# Patient Record
Sex: FEMALE | Employment: OTHER | URBAN - METROPOLITAN AREA
[De-identification: names, ages, dates, MRNs, and addresses within clinical notes are randomized per-mention and may not be internally consistent; named-entity substitution may affect disease eponyms.]

---

## 2017-01-18 ENCOUNTER — HOSPITAL ENCOUNTER (OUTPATIENT)
Dept: PHYSICAL THERAPY | Age: 77
Discharge: HOME | End: 2017-01-18
Payer: MEDICARE

## 2017-01-18 PROCEDURE — 97110 THERAPEUTIC EXERCISES: CPT

## 2017-01-19 NOTE — PROGRESS NOTES
Alveta Holstein   (ZPL:[id number]) [address]  at  [address]  [phone]  [phone]                Outpatient PHYSICAL THERAPY: Daily Note  Fall Risk Score: 7 (? 5 = High Risk)    ICD-10: Treatment Diagnosis: Difficulty walking, not elsewhere classified (R26.2), Muscle weakness, generalized (M62.81), Pain in right wrist (M25.531)    DATE: 1/18/2017  REFERRING PHYSICIAN:  Solomon Edward MD MD Orders: evaluate and treat  Return Physician Appointment: TBD  MEDICAL/REFERRING DIAGNOSIS: LE weakness, right wrist pain  DATE OF ONSET: 9/27/16   PRIOR LEVEL OF FUNCTION: independent  PRECAUTIONS/ALLERGIES:   Allergies   Allergen Reactions    Coenzyme Q10 Rash    Niacin (Inositol Niacinate) Rash    Adhesive Tape-Silicones Rash     sts paper tape    Ambien [Zolpidem] Other (comments)     nightmares    Keflex [Cephalexin] Rash    Oxycodone Other (comments)     Slurred speech, hallucinations, droopy face, word finding issues    Pcn [Penicillins] Rash    Ramipril Other (comments)     nightmares       ASSESSMENT:  ?????? ? ? This section established at most recent assessment?????????? Patient is a 77 y.o. female who presents to physical therapy with difficulty walking, decreased balance and history of recent fall which resulted in right wrist injury. She also presents with weakness and decreased mobility which limits her ability to perform ADL without assistance and places patient at risk for falls in the future. Patient would benefit from skilled physical therapy to improve overall mobility and function. PROBLEM LIST (Impairments causing functional limitations):  1. Decreased Strength affecting function  2. Decreased ADL/Functional Activities  3. Decreased Flexibility/joint mobility  4. Increased Pain affecting function  5.  Decreased Activity tolerance   GOALS: (Goals have been discussed and agreed upon with patient.)  SHORT-TERM FUNCTIONAL GOALS: Time Frame: 6 weeks  1. Patient demonstrates independence with home exercise program without verbal cueing provided by therapist.  2. Patient will perform 5 minutes or greater of standing functional tasks to improve ability to cook with less difficulty. DISCHARGE GOALS: Time Frame: 12 weeks  1. Patient will report LEFS score of 45/80 or greater to demonstrate improved LE function. 2. Patient will decrease TUG score to less than 13 seconds to reduce risk of future falls. 3. Patient will decrease 5 stair up/down to less than 15 seconds to improve ability to ambulate around her community. 4. Patient will improve her dynamic balance by increasing her ability to maintain semi tandem stance for greater than 20 seconds to reduce her risk of future falls. REHABILITATION POTENTIAL FOR STATED GOALS: GoodPLAN OF CARE:INTERVENTIONS PLANNED: (Benefits and precautions of physical therapy have been discussed with the patient)  1. Patient education including pathophysiology of falls and decreased mobility, back education/training (sleeping, sitting and standing positions, posture re-education and body mechanics) and instructions of home exercise program.   2. Therapeutic exercises including strength, mobility and flexibility tasks. 3. Manual therapy including soft tissue mobilizations, functional joint mobilizations and neuromuscular re-education to improve motion and reduce pain. TREATMENT PLAN EFFECTIVE DATES: 12/1/16 TO 2/23/17  FREQUENCY/DURATION: Follow patient 2 times a week for 12 weeks to address above goals. SUBJECTIVE:    History of Present Injury/Illness (Reason for Referral): Ras Alcantara presents with difficulty walking, weakness and decreased balance. She also has had recent fall which resulted in right wrist fracture which required surgery to repair. She is well known to therapist having undergone previous therapy bout to help improve mobility and balance when her fall occurred.  Her goals are to improve her leg strength and not fall any more. Aggravating factors: cooking, doing dishes, lifting Relieving factors: medicine, heat    Present Symptoms: Patient reports she has been exercising a lot at home this week. Pain Intensity 1: 0  Dominant Side: right  Past Medical History: Ms. Vianey Bertrand  has a past medical history of Acute renal failure [facility]) (June, 2008); Arthritis; Asthma; CAD (coronary artery disease); COPD (chronic obstructive pulmonary disease) ([facility] Deep vein thrombosis of lower leg [facility]) (June, 2008); Diabetes ([facility] Diabetes mellitus ([facility] Dislocation of right shoulder joint; Dyspnea on exertion (October, 2010); GERD (gastroesophageal reflux disease); High cholesterol; Hypertension; Hypothyroid; Morbid obesity ([facility] Myocardial infarction ([facility] (1991); Osteomyelitis of left foot (HCC) (???); Restless leg syndrome; and Sleep apnea. She also has no past medical history of Aneurysm ([facility] Arrhythmia; Autoimmune disease ([facility] Cancer ([facility] Chronic kidney disease; Coagulation defects; DEMENTIA; Difficult intubation; Infectious disease; Liver disease; Malignant hyperthermia due to anesthesia; Nausea & vomiting; Neurological disorder; Pseudocholinesterase deficiency; Psychiatric disorder; PUD (peptic ulcer disease); Stroke [facility]); or Thyroid disease. She also  has a past surgical history that includes tonsil and adenoidectomy (Childhood); coronary stent placement (1997 & 2010); hernia repair (2006); breast biopsy (2000); tubal ligation (1977); tonsillectomy; adenoidectomy; cardiac surg procedure unlist; knee replacement (May, 2008); orthopaedic; and orthopaedic. Current Medications:   Current Outpatient Prescriptions on File Prior to Encounter   Medication Sig Dispense Refill    magnesium oxide (MAG-OX) 400 mg tablet Take 400 mg by mouth daily.  insulin glargine (LANTUS) 100 unit/mL injection 50 Units by SubCUTAneous route every morning.       insulin glargine (LANTUS) 100 unit/mL injection 30 Units by SubCUTAneous route nightly.  insulin regular (NOVOLIN R, HUMULIN R) 100 unit/mL injection by SubCUTAneous route. Regular Insulin 8 units before breakfast and 8 units before lunch and 10 units at bedtime      oxybutynin chloride XL 10 mg CR tablet Take 10 mg by mouth daily. Take / use AM day of surgery with a sip of water per anesthesia protocols. Indications: \"BLADDER\"      amLODIPine (NORVASC) 5 mg tablet Take 5 mg by mouth daily. Take / use AM day of surgery with a sip of water per anesthesia protocols. Indications: HYPERTENSION      metoprolol-XL (TOPROL-XL) 100 mg XL tablet Take 100 mg by mouth daily. Take / use AM day of surgery with a sip of water per anesthesia protocols. Indications: HYPERTENSION      prasugrel (EFFIENT) 10 mg tablet Take 10 mg by mouth daily. Dr Carlton Witt and Dr Tucker Needle aware that pt is continuing medication prior to surgery. Indications: THROMBOSIS PREVENTION AFTER PCI      aspirin (ASPIRIN) 325 mg tablet Take 325 mg by mouth every morning.  valsartan-hydrochlorothiazide (DIOVAN-HCT) 320-25 mg per tablet Take 1 Tab by mouth daily. Indications: HYPERTENSION      levothyroxine (SYNTHROID) 50 mcg tablet Take 50 mcg by mouth Daily (before breakfast). Take / use AM day of surgery with a sip of water per anesthesia protocols. Indications: HYPOTHYROIDISM      budesonide-formoterol (SYMBICORT) 160-4.5 mcg/Actuation HFA inhaler Take 2 Puffs by inhalation two (2) times a day. Take / use AM day of surgery with a sip of water per anesthesia protocols.  atorvastatin (LIPITOR) 80 mg tablet Take 80 mg by mouth every evening. Indications: HYPERCHOLESTEROLEMIA      furosemide (LASIX) 20 mg tablet Take 20 mg by mouth daily. Indications: EDEMA      ezetimibe (ZETIA) 10 mg tablet Take 10 mg by mouth every evening.  Indications: HYPERCHOLESTEROLEMIA      albuterol (PROVENTIL, VENTOLIN) 90 mcg/Actuation inhaler Take 2 Puffs by inhalation every four (4) hours as needed for Shortness of Breath.  fluoxetine (PROZAC) 20 mg Tab Take 20 mg by mouth daily. Take / use AM day of surgery with a sip of water per anesthesia protocols. No current facility-administered medications on file prior to encounter. Date Last Reviewed:1/18/2017  Social History/Home Situation:  Lives in a private home setting. No physical barriers present in home setting at this time. Work/Activity History: Retired  Quality of Life: Patient rates her quality of life as good. OBJECTIVE:    Outcome Measure: Tool Used: Lower Extremity Functional Scale (LEFS)  Score:  Initial: 14/80 (12/1/16) Most Recent: X/80 (Date: -- )   Interpretation of Score: 20 questions each scored on a 5 point scale with 0 representing \"extreme difficulty or unable to perform\" and 4 representing \"no difficulty\". The lower the score, the greater the functional disability. 80/80 represents no disability. Minimal detectable change is 9 points. Score 80 79-63 62-48 47-32 31-16 15-1 0   Modifier CH CI CJ CK CL CM CN     ? Mobility - Walking and Moving Around:     - CURRENT STATUS: CM - 80%-99% impaired, limited or restricted    - GOAL STATUS:  CK - 40%-59% impaired, limited or restricted    - D/C STATUS:  ---------------To be determined---------------       Strength:       RIGHT LEFT    Knee extension  4/5 4/5    Knee flexion 4/5 4/5    Hip extension  4/5 4/5    Hip abduction 4/5 4/5    Hip flexion 4/5 4/5                      Functional Mobility:            Timed Up and Go 13.78 seconds with rollator     Stair (5 steps) 30.23 with min A from therapist to steady    Sit to stands (5 reps) 10.18 sec (21 in chair height)    Sit to stands (30 sec) 14 repetitions (21 in chair height)    Functional Activity Tolerance 2 minutes           Observation/Gait Assessment:  Patient ambulates with rollator with decreased step length, heavy UE reliance on AD and forward trunk lean.  Observable fatigue noted with approximately 50 ft of continuous walking. Balance:     - Semi Tandem Stance: Right- 20.13 sec, Left- 10.11 sec   - Tandem stance: Unable   - Single leg Stance: Unable     Objective data as of: 12/1/16  TREATMENT:    (In addition to Assessment/Re-Assessment sessions the following treatments were rendered)  Therapeutic Exercise: (45 Minutes):  Exercises per grid below to improve mobility, strength and balance. Required moderate visual, verbal and manual cues to promote proper body mechanics. Progressed resistance, range, repetitions and complexity of movement as indicated. (used abbreviations BET- back education training) Date:  12/8/16 Date:  12/27/16 Date:  1/18/17   Activity/Exercise Parameters Parameters Parameters   Patient Education Update HEP Review HEP Update HEP   LAQ 2 min 2 min 2 min   Sit to stand  2 x 10 2 x 10 2 x 10, with bicep curl, #3s   Kitchen sink balance 5 min Heel raise, kicks, marches, 2 x 20 each Marches x 20   Walking bouts 1 min x 3 1:30 x 3 1 min x 3   Stair taps 20 x 3 -- --   Standing trials 1 min x 3 1 min x 2 --   Bike -- 5 min  5 min   [place] -- -- 3 x 10       Manual Therapy (     ):   For improved ROM and mobility:    · To be provided next visit     (Used abbreviations: MET - muscle energy technique; PNF - proprioceptive neuromuscular facilitation; NMR - neuromuscular re-education; a/p - anterior to posterior; p/a - posterior to anterior, FMP - functional movement patterns)  Therapeutic Modalities: (for pain and inflammation)                                                                                              HEP: As above; handouts given to patient for all exercises. ______________________________________________________________________________________________________    Treatment Assessment:  Patient continues to fatigue very quickly with any prolonged weight bearing activity and requires frequent rest breaks throughout today's session.  Patient reports 0/10 pain at end of today's visit. Progression/Medical Necessity:   · Patient is expected to demonstrate progress in strength, range of motion and balance to increase independence with ADL tasks. Compliance with Program/Exercises: compliant most of the time. Reason for Continuation of Services/Other Comments:  · Patient continues to require present interventions due to patient's inability to cook, clean and walk without difficulty. Recommendations/Intent for next treatment session: Continue to challenge dynamic balance and strength.      Total Treatment Duration: 45 minutes   PT Patient Time In/Time Out  Time In: 1630  Time Out: 1475  [address] [facility] PT, DPT

## 2017-01-25 ENCOUNTER — HOSPITAL ENCOUNTER (OUTPATIENT)
Dept: PHYSICAL THERAPY | Age: 77
Discharge: HOME | End: 2017-01-25
Payer: MEDICARE

## 2017-01-25 NOTE — PROGRESS NOTES
Therapy Center at [address] [place] [address], Adore Erazo   [phone]   [phone]    DATE: 1/25/2017    Patient cancelled her appointment today due to not feeling well. Will plan to follow up on next scheduled visit.       Jm Guzman, PT, DPT

## 2017-02-15 ENCOUNTER — HOSPITAL ENCOUNTER (OUTPATIENT)
Dept: PHYSICAL THERAPY | Age: 77
Discharge: HOME | End: 2017-02-15
Payer: MEDICARE

## 2017-02-15 PROCEDURE — 97110 THERAPEUTIC EXERCISES: CPT

## 2017-02-15 NOTE — PROGRESS NOTES
Benjamin Columbus   (XWM:[id number]) [address] at  [address]  [phone]  [phone]                Outpatient PHYSICAL THERAPY: Daily Note  Fall Risk Score: 7 (? 5 = High Risk)    ICD-10: Treatment Diagnosis: Difficulty walking, not elsewhere classified (R26.2), Muscle weakness, generalized (M62.81), Pain in right wrist (M25.531)    DATE: 2/15/2017  REFERRING PHYSICIAN:  Lee Aldana MD MD Orders: evaluate and treat  Return Physician Appointment: TBD  MEDICAL/REFERRING DIAGNOSIS: LE weakness, right wrist pain  DATE OF ONSET: 9/27/16   PRIOR LEVEL OF FUNCTION: independent  PRECAUTIONS/ALLERGIES:   Allergies   Allergen Reactions    Coenzyme Q10 Rash    Niacin (Inositol Niacinate) Rash    Adhesive Tape-Silicones Rash     sts paper tape    Ambien [Zolpidem] Other (comments)     nightmares    Keflex [Cephalexin] Rash    Oxycodone Other (comments)     Slurred speech, hallucinations, droopy face, word finding issues    Pcn [Penicillins] Rash    Ramipril Other (comments)     nightmares       ASSESSMENT:  ?????? ? ? This section established at most recent assessment?????????? Patient is a 77 y.o. female who presents to physical therapy with difficulty walking, decreased balance and history of recent fall which resulted in right wrist injury. She also presents with weakness and decreased mobility which limits her ability to perform ADL without assistance and places patient at risk for falls in the future. Patient would benefit from skilled physical therapy to improve overall mobility and function. PROBLEM LIST (Impairments causing functional limitations):  1. Decreased Strength affecting function  2. Decreased ADL/Functional Activities  3. Decreased Flexibility/joint mobility  4. Increased Pain affecting function  5.  Decreased Activity tolerance   GOALS: (Goals have been discussed and agreed upon with patient.)  SHORT-TERM FUNCTIONAL GOALS: Time Frame: 6 weeks  1. Patient demonstrates independence with home exercise program without verbal cueing provided by therapist.  2. Patient will perform 5 minutes or greater of standing functional tasks to improve ability to cook with less difficulty. DISCHARGE GOALS: Time Frame: 12 weeks  1. Patient will report LEFS score of 45/80 or greater to demonstrate improved LE function. 2. Patient will decrease TUG score to less than 13 seconds to reduce risk of future falls. 3. Patient will decrease 5 stair up/down to less than 15 seconds to improve ability to ambulate around her community. 4. Patient will improve her dynamic balance by increasing her ability to maintain semi tandem stance for greater than 20 seconds to reduce her risk of future falls. REHABILITATION POTENTIAL FOR STATED GOALS: GoodPLAN OF CARE:INTERVENTIONS PLANNED: (Benefits and precautions of physical therapy have been discussed with the patient)  1. Patient education including pathophysiology of falls and decreased mobility, back education/training (sleeping, sitting and standing positions, posture re-education and body mechanics) and instructions of home exercise program.   2. Therapeutic exercises including strength, mobility and flexibility tasks. 3. Manual therapy including soft tissue mobilizations, functional joint mobilizations and neuromuscular re-education to improve motion and reduce pain. TREATMENT PLAN EFFECTIVE DATES: 12/1/16 TO 2/23/17  FREQUENCY/DURATION: Follow patient 2 times a week for 12 weeks to address above goals. SUBJECTIVE:    History of Present Injury/Illness (Reason for Referral): Denise Erickson presents with difficulty walking, weakness and decreased balance. She also has had recent fall which resulted in right wrist fracture which required surgery to repair. She is well known to therapist having undergone previous therapy bout to help improve mobility and balance when her fall occurred.  Her goals are to improve her leg strength and not fall any more. Aggravating factors: cooking, doing dishes, lifting Relieving factors: medicine, heat    Present Symptoms: Patient says \"I've been bad about not exercising regularly the past week. \"  Pain Intensity 1: 0  Dominant Side: right  Past Medical History: Ms. Veto Christianson  has a past medical history of Acute renal failure [facility]) (June, 2008); Arthritis; Asthma; CAD (coronary artery disease); COPD (chronic obstructive pulmonary disease) ([facility] [address] Deep vein thrombosis of lower leg [facility]) (June, 2008); Diabetes ([facility] [address] Diabetes mellitus ([facility] [address] Dislocation of right shoulder joint; Dyspnea on exertion (October, 2010); GERD (gastroesophageal reflux disease); High cholesterol; Hypertension; Hypothyroid; Morbid obesity ([facility] [address] Myocardial infarction ([facility] [address] (1991); Osteomyelitis of left foot (HCC) (???); Restless leg syndrome; and Sleep apnea. She also has no past medical history of Aneurysm ([facility] [address] Arrhythmia; Autoimmune disease ([facility] [address] Cancer ([facility] [address] Chronic kidney disease; Coagulation defects; DEMENTIA; Difficult intubation; Infectious disease; Liver disease; Malignant hyperthermia due to anesthesia; Nausea & vomiting; Neurological disorder; Pseudocholinesterase deficiency; Psychiatric disorder; PUD (peptic ulcer disease); Stroke [facility]); or Thyroid disease. She also  has a past surgical history that includes tonsil and adenoidectomy (Childhood); coronary stent placement (1997 & 2010); hernia repair (2006); breast biopsy (2000); tubal ligation (1977); tonsillectomy; adenoidectomy; cardiac surg procedure unlist; knee replacement (May, 2008); orthopaedic; and orthopaedic. Current Medications:   Current Outpatient Prescriptions on File Prior to Encounter   Medication Sig Dispense Refill    magnesium oxide (MAG-OX) 400 mg tablet Take 400 mg by mouth daily.  insulin glargine (LANTUS) 100 unit/mL injection 50 Units by SubCUTAneous route every morning.       insulin glargine (LANTUS) 100 unit/mL injection 30 Units by SubCUTAneous route nightly.  insulin regular (NOVOLIN R, HUMULIN R) 100 unit/mL injection by SubCUTAneous route. Regular Insulin 8 units before breakfast and 8 units before lunch and 10 units at bedtime      oxybutynin chloride XL 10 mg CR tablet Take 10 mg by mouth daily. Take / use AM day of surgery with a sip of water per anesthesia protocols. Indications: \"BLADDER\"      amLODIPine (NORVASC) 5 mg tablet Take 5 mg by mouth daily. Take / use AM day of surgery with a sip of water per anesthesia protocols. Indications: HYPERTENSION      metoprolol-XL (TOPROL-XL) 100 mg XL tablet Take 100 mg by mouth daily. Take / use AM day of surgery with a sip of water per anesthesia protocols. Indications: HYPERTENSION      prasugrel (EFFIENT) 10 mg tablet Take 10 mg by mouth daily. Dr Vicky Yee and Dr Kaleigh Alvarez aware that pt is continuing medication prior to surgery. Indications: THROMBOSIS PREVENTION AFTER PCI      aspirin (ASPIRIN) 325 mg tablet Take 325 mg by mouth every morning.  valsartan-hydrochlorothiazide (DIOVAN-HCT) 320-25 mg per tablet Take 1 Tab by mouth daily. Indications: HYPERTENSION      levothyroxine (SYNTHROID) 50 mcg tablet Take 50 mcg by mouth Daily (before breakfast). Take / use AM day of surgery with a sip of water per anesthesia protocols. Indications: HYPOTHYROIDISM      budesonide-formoterol (SYMBICORT) 160-4.5 mcg/Actuation HFA inhaler Take 2 Puffs by inhalation two (2) times a day. Take / use AM day of surgery with a sip of water per anesthesia protocols.  atorvastatin (LIPITOR) 80 mg tablet Take 80 mg by mouth every evening. Indications: HYPERCHOLESTEROLEMIA      furosemide (LASIX) 20 mg tablet Take 20 mg by mouth daily. Indications: EDEMA      ezetimibe (ZETIA) 10 mg tablet Take 10 mg by mouth every evening.  Indications: HYPERCHOLESTEROLEMIA      albuterol (PROVENTIL, VENTOLIN) 90 mcg/Actuation inhaler Take 2 Puffs by inhalation every four (4) hours as needed for Shortness of Breath.  fluoxetine (PROZAC) 20 mg Tab Take 20 mg by mouth daily. Take / use AM day of surgery with a sip of water per anesthesia protocols. No current facility-administered medications on file prior to encounter. Date Last Reviewed:2/15/2017  Social History/Home Situation:  Lives in a private home setting. No physical barriers present in home setting at this time. Work/Activity History: Retired  Quality of Life: Patient rates her quality of life as good. OBJECTIVE:    Outcome Measure: Tool Used: Lower Extremity Functional Scale (LEFS)  Score:  Initial: 14/80 (12/1/16) Most Recent: X/80 (Date: -- )   Interpretation of Score: 20 questions each scored on a 5 point scale with 0 representing \"extreme difficulty or unable to perform\" and 4 representing \"no difficulty\". The lower the score, the greater the functional disability. 80/80 represents no disability. Minimal detectable change is 9 points. Score 80 79-63 62-48 47-32 31-16 15-1 0   Modifier CH CI CJ CK CL CM CN     ? Mobility - Walking and Moving Around:     - CURRENT STATUS: CM - 80%-99% impaired, limited or restricted    - GOAL STATUS:  CK - 40%-59% impaired, limited or restricted    - D/C STATUS:  ---------------To be determined---------------       Strength:       RIGHT LEFT    Knee extension  4/5 4/5    Knee flexion 4/5 4/5    Hip extension  4/5 4/5    Hip abduction 4/5 4/5    Hip flexion 4/5 4/5                      Functional Mobility:            Timed Up and Go 13.78 seconds with rollator     Stair (5 steps) 30.23 with min A from therapist to steady    Sit to stands (5 reps) 10.18 sec (21 in chair height)    Sit to stands (30 sec) 14 repetitions (21 in chair height)    Functional Activity Tolerance 2 minutes           Observation/Gait Assessment:  Patient ambulates with rollator with decreased step length, heavy UE reliance on AD and forward trunk lean.  Observable fatigue noted with approximately 50 ft of continuous walking. Balance:     - Semi Tandem Stance: Right- 20.13 sec, Left- 10.11 sec   - Tandem stance: Unable   - Single leg Stance: Unable     Objective data as of: 12/1/16  TREATMENT:    (In addition to Assessment/Re-Assessment sessions the following treatments were rendered)  Therapeutic Exercise: (45 Minutes):  Exercises per grid below to improve mobility, strength and balance. Required moderate visual, verbal and manual cues to promote proper body mechanics. Progressed resistance, range, repetitions and complexity of movement as indicated. (used abbreviations BET- back education training) Date:  12/27/16 Date:  1/18/17 Date:  2/15/17   Activity/Exercise Parameters Parameters Parameters   Patient Education Review HEP Update HEP Review HEP   LAQ 2 min 2 min 2 min x 2   Sit to stand  2 x 10 2 x 10, with bicep curl, #3s 2 x 10   Kitchen sink balance Heel raise, kicks, marches, 2 x 20 each Marches x 20 Marches, 1 min x 2   Walking bouts 1:30 x 3 1 min x 3 1:17, 1:27   Stair taps -- -- --   Standing trials 1 min x 2 -- --   Bike 5 min  5 min 8 min   [place] -- 3 x 10 --   Seated march, heel raise -- -- 1 min x 3 each       Manual Therapy (     ):   For improved ROM and mobility:    · To be provided next visit     (Used abbreviations: MET - muscle energy technique; PNF - proprioceptive neuromuscular facilitation; NMR - neuromuscular re-education; a/p - anterior to posterior; p/a - posterior to anterior, FMP - functional movement patterns)  Therapeutic Modalities: (for pain and inflammation)                                                                                              HEP: As above; handouts given to patient for all exercises.   ______________________________________________________________________________________________________    Treatment Assessment:  Patient overall endurance with walking tasks continues to be poor and extended time spent discussing need for daily HEP performance to improve overall activity levels and LE strength/endurance to improve ability to independently perform ADL tasks. Patient reports 0/10 pain at end of today's visit. Progression/Medical Necessity:   · Patient is expected to demonstrate progress in strength, range of motion and balance to increase independence with ADL tasks. Compliance with Program/Exercises: compliant most of the time. Reason for Continuation of Services/Other Comments:  · Patient continues to require present interventions due to patient's inability to cook, clean and walk without difficulty. Recommendations/Intent for next treatment session: Continue to challenge dynamic balance and strength.      Total Treatment Duration: 45 minutes   PT Patient Time In/Time Out  Time In: 1430  Time Out: 1215 [facility], PT, DPT

## 2017-02-22 ENCOUNTER — HOSPITAL ENCOUNTER (OUTPATIENT)
Dept: PHYSICAL THERAPY | Age: 77
Discharge: HOME | End: 2017-02-22
Payer: MEDICARE

## 2017-02-22 PROCEDURE — 97110 THERAPEUTIC EXERCISES: CPT

## 2017-02-22 NOTE — PROGRESS NOTES
Rosaline Cheung   ([id number]) [address] at  [address]  [phone]  [phone]                Outpatient PHYSICAL THERAPY: Daily Note  Fall Risk Score: 7 (? 5 = High Risk)    ICD-10: Treatment Diagnosis: Difficulty walking, not elsewhere classified (R26.2), Muscle weakness, generalized (M62.81), Pain in right wrist (M25.531)    DATE: 2/22/2017  REFERRING PHYSICIAN:  Tammie Escamilla MD MD Orders: evaluate and treat  Return Physician Appointment: TBD  MEDICAL/REFERRING DIAGNOSIS: LE weakness, right wrist pain  DATE OF ONSET: 9/27/16   PRIOR LEVEL OF FUNCTION: independent  PRECAUTIONS/ALLERGIES:   Allergies   Allergen Reactions    Coenzyme Q10 Rash    Niacin (Inositol Niacinate) Rash    Adhesive Tape-Silicones Rash     sts paper tape    Ambien [Zolpidem] Other (comments)     nightmares    Keflex [Cephalexin] Rash    Oxycodone Other (comments)     Slurred speech, hallucinations, droopy face, word finding issues    Pcn [Penicillins] Rash    Ramipril Other (comments)     nightmares       ASSESSMENT:  ?????? ? ? This section established at most recent assessment?????????? Patient is a 77 y.o. female who presents to physical therapy with difficulty walking, decreased balance and history of recent fall which resulted in right wrist injury. She also presents with weakness and decreased mobility which limits her ability to perform ADL without assistance and places patient at risk for falls in the future. Patient would benefit from skilled physical therapy to improve overall mobility and function. PROBLEM LIST (Impairments causing functional limitations):  1. Decreased Strength affecting function  2. Decreased ADL/Functional Activities  3. Decreased Flexibility/joint mobility  4. Increased Pain affecting function  5.  Decreased Activity tolerance   GOALS: (Goals have been discussed and agreed upon with patient.)  SHORT-TERM FUNCTIONAL GOALS: Time Frame: 6 weeks  1. Patient demonstrates independence with home exercise program without verbal cueing provided by therapist.  2. Patient will perform 5 minutes or greater of standing functional tasks to improve ability to cook with less difficulty. DISCHARGE GOALS: Time Frame: 12 weeks  1. Patient will report LEFS score of 45/80 or greater to demonstrate improved LE function. 2. Patient will decrease TUG score to less than 13 seconds to reduce risk of future falls. 3. Patient will decrease 5 stair up/down to less than 15 seconds to improve ability to ambulate around her community. 4. Patient will improve her dynamic balance by increasing her ability to maintain semi tandem stance for greater than 20 seconds to reduce her risk of future falls. REHABILITATION POTENTIAL FOR STATED GOALS: GoodPLAN OF CARE:INTERVENTIONS PLANNED: (Benefits and precautions of physical therapy have been discussed with the patient)  1. Patient education including pathophysiology of falls and decreased mobility, back education/training (sleeping, sitting and standing positions, posture re-education and body mechanics) and instructions of home exercise program.   2. Therapeutic exercises including strength, mobility and flexibility tasks. 3. Manual therapy including soft tissue mobilizations, functional joint mobilizations and neuromuscular re-education to improve motion and reduce pain. TREATMENT PLAN EFFECTIVE DATES: 12/1/16 TO 2/23/17  FREQUENCY/DURATION: Follow patient 2 times a week for 12 weeks to address above goals. SUBJECTIVE:    History of Present Injury/Illness (Reason for Referral): Tello Collins presents with difficulty walking, weakness and decreased balance. She also has had recent fall which resulted in right wrist fracture which required surgery to repair. She is well known to therapist having undergone previous therapy bout to help improve mobility and balance when her fall occurred.  Her goals are to improve her leg strength and not fall any more. Aggravating factors: cooking, doing dishes, lifting Relieving factors: medicine, heat    Present Symptoms: Patient reports she's been trying to walk more around her home. Pain Intensity 1: 0  Dominant Side: right  Past Medical History: Ms. Karen Marlow  has a past medical history of Acute renal failure [facility]) (June, 2008); Arthritis; Asthma; CAD (coronary artery disease); COPD [facility]chronic obstructive [facility]) [facility] Deep vein thrombosis of lower leg [facility]) (June, 2008); Diabetes ([facility] Diabetes mellitus ([facility] Dislocation of right shoulder joint; Dyspnea on exertion (October, 2010); GERD (gastroesophageal reflux disease); High cholesterol; Hypertension; Hypothyroid; Morbid obesity ([facility] Myocardial infarction ([facility] (1991); Osteomyelitis of left foot (HCC) (???); Restless leg syndrome; and Sleep apnea. She also has no past medical history of Aneurysm ([facility] Arrhythmia; Autoimmune disease ([facility] Cancer ([facility] Chronic kidney disease; Coagulation defects; DEMENTIA; Difficult intubation; Infectious disease; Liver disease; Malignant hyperthermia due to anesthesia; Nausea & vomiting; Neurological disorder; Pseudocholinesterase deficiency; Psychiatric disorder; PUD (peptic ulcer disease); Stroke [facility]); or Thyroid disease. She also  has a past surgical history that includes tonsil and adenoidectomy (Childhood); coronary stent placement (1997 & 2010); hernia repair (2006); breast biopsy (2000); tubal ligation (1977); tonsillectomy; adenoidectomy; cardiac surg procedure unlist; knee replacement (May, 2008); orthopaedic; and orthopaedic. Current Medications:   Current Outpatient Prescriptions on File Prior to Encounter   Medication Sig Dispense Refill    magnesium oxide (MAG-OX) 400 mg tablet Take 400 mg by mouth daily.  insulin glargine (LANTUS) 100 unit/mL injection 50 Units by SubCUTAneous route every morning.       insulin glargine (LANTUS) 100 unit/mL injection 30 Units by SubCUTAneous route nightly.  insulin regular (NOVOLIN R, HUMULIN R) 100 unit/mL injection by SubCUTAneous route. Regular Insulin 8 units before breakfast and 8 units before lunch and 10 units at bedtime      oxybutynin chloride XL 10 mg CR tablet Take 10 mg by mouth daily. Take / use AM day of surgery with a sip of water per anesthesia protocols. Indications: \"BLADDER\"      amLODIPine (NORVASC) 5 mg tablet Take 5 mg by mouth daily. Take / use AM day of surgery with a sip of water per anesthesia protocols. Indications: HYPERTENSION      metoprolol-XL (TOPROL-XL) 100 mg XL tablet Take 100 mg by mouth daily. Take / use AM day of surgery with a sip of water per anesthesia protocols. Indications: HYPERTENSION      prasugrel (EFFIENT) 10 mg tablet Take 10 mg by mouth daily. Dr Ene Schmid and Dr Frank Gutierrez aware that pt is continuing medication prior to surgery. Indications: THROMBOSIS PREVENTION AFTER PCI      aspirin (ASPIRIN) 325 mg tablet Take 325 mg by mouth every morning.  valsartan-hydrochlorothiazide (DIOVAN-HCT) 320-25 mg per tablet Take 1 Tab by mouth daily. Indications: HYPERTENSION      levothyroxine (SYNTHROID) 50 mcg tablet Take 50 mcg by mouth Daily (before breakfast). Take / use AM day of surgery with a sip of water per anesthesia protocols. Indications: HYPOTHYROIDISM      budesonide-formoterol (SYMBICORT) 160-4.5 mcg/Actuation HFA inhaler Take 2 Puffs by inhalation two (2) times a day. Take / use AM day of surgery with a sip of water per anesthesia protocols.  atorvastatin (LIPITOR) 80 mg tablet Take 80 mg by mouth every evening. Indications: HYPERCHOLESTEROLEMIA      furosemide (LASIX) 20 mg tablet Take 20 mg by mouth daily. Indications: EDEMA      ezetimibe (ZETIA) 10 mg tablet Take 10 mg by mouth every evening.  Indications: HYPERCHOLESTEROLEMIA      albuterol (PROVENTIL, VENTOLIN) 90 mcg/Actuation inhaler Take 2 Puffs by inhalation every four (4) hours as needed for Shortness of Breath.  fluoxetine (PROZAC) 20 mg Tab Take 20 mg by mouth daily. Take / use AM day of surgery with a sip of water per anesthesia protocols. No current facility-administered medications on file prior to encounter. Date Last Reviewed:2/22/2017  Social History/Home Situation:  Lives in a private home setting. No physical barriers present in home setting at this time. Work/Activity History: Retired  Quality of Life: Patient rates her quality of life as good. OBJECTIVE:    Outcome Measure: Tool Used: Lower Extremity Functional Scale (LEFS)  Score:  Initial: 14/80 (12/1/16) Most Recent: X/80 (Date: -- )   Interpretation of Score: 20 questions each scored on a 5 point scale with 0 representing \"extreme difficulty or unable to perform\" and 4 representing \"no difficulty\". The lower the score, the greater the functional disability. 80/80 represents no disability. Minimal detectable change is 9 points. Score 80 79-63 62-48 47-32 31-16 15-1 0   Modifier CH CI CJ CK CL CM CN     ? Mobility - Walking and Moving Around:     - CURRENT STATUS: CM - 80%-99% impaired, limited or restricted    - GOAL STATUS:  CK - 40%-59% impaired, limited or restricted    - D/C STATUS:  ---------------To be determined---------------       Strength:       RIGHT LEFT    Knee extension  4/5 4/5    Knee flexion 4/5 4/5    Hip extension  4/5 4/5    Hip abduction 4/5 4/5    Hip flexion 4/5 4/5                      Functional Mobility:            Timed Up and Go 13.78 seconds with rollator     Stair (5 steps) 30.23 with min A from therapist to steady    Sit to stands (5 reps) 10.18 sec (21 in chair height)    Sit to stands (30 sec) 14 repetitions (21 in chair height)    Functional Activity Tolerance 2 minutes           Observation/Gait Assessment:  Patient ambulates with rollator with decreased step length, heavy UE reliance on AD and forward trunk lean.  Observable fatigue noted with approximately 50 ft of continuous walking. Balance:     - Semi Tandem Stance: Right- 20.13 sec, Left- 10.11 sec   - Tandem stance: Unable   - Single leg Stance: Unable     Objective data as of: 12/1/16  TREATMENT:    (In addition to Assessment/Re-Assessment sessions the following treatments were rendered)  Therapeutic Exercise: (45 Minutes):  Exercises per grid below to improve mobility, strength and balance. Required moderate visual, verbal and manual cues to promote proper body mechanics. Progressed resistance, range, repetitions and complexity of movement as indicated. (used abbreviations BET- back education training) Date:  1/18/17 Date:  2/15/17 Date:  2/22/17   Activity/Exercise Parameters Parameters Parameters   Patient Education Update HEP Review HEP Review HEP   LAQ 2 min 2 min x 2 2 min x 2, #5   Sit to stand  2 x 10, with bicep curl, #3s 2 x 10 2 x 10   Kitchen sink balance Marches x 20 Marches, 1 min x 2 Side steps around table x 2 min   Walking bouts 1 min x 3 1:17, 1:27 2:04, 1:45   Stair taps -- -- --   Standing trials -- -- --   Bike 5 min 8 min 6 min   [place] 3 x 10 -- --   Seated march, heel raise -- 1 min x 3 each 2 min each, #5       Manual Therapy (     ):   For improved ROM and mobility:    · To be provided next visit     (Used abbreviations: MET - muscle energy technique; PNF - proprioceptive neuromuscular facilitation; NMR - neuromuscular re-education; a/p - anterior to posterior; p/a - posterior to anterior, FMP - functional movement patterns)  Therapeutic Modalities: (for pain and inflammation)                                                                                              HEP: As above; handouts given to patient for all exercises.   ______________________________________________________________________________________________________    Treatment Assessment:  Patient walking tolerance slightly increases today but is still limited by SOB and leg fatigue during ambulation bouts. HEP performance and consistency with exercises again stressed to patient throughout today's visit. Patient reports 0/10 pain at end of today's visit. Progression/Medical Necessity:   · Patient is expected to demonstrate progress in strength, range of motion and balance to increase independence with ADL tasks. Compliance with Program/Exercises: compliant most of the time. Reason for Continuation of Services/Other Comments:  · Patient continues to require present interventions due to patient's inability to cook, clean and walk without difficulty. Recommendations/Intent for next treatment session: Continue to challenge dynamic balance and strength.      Total Treatment Duration: 45 minutes   PT Patient Time In/Time Out  Time In: 1430  Time Out: 1215 [facility], PT, DPT

## 2017-03-08 ENCOUNTER — HOSPITAL ENCOUNTER (OUTPATIENT)
Dept: PHYSICAL THERAPY | Age: 77
Discharge: HOME | End: 2017-03-08
Payer: MEDICARE

## 2017-03-08 PROCEDURE — G8979 MOBILITY GOAL STATUS: HCPCS

## 2017-03-08 PROCEDURE — G8978 MOBILITY CURRENT STATUS: HCPCS

## 2017-03-08 PROCEDURE — 97110 THERAPEUTIC EXERCISES: CPT

## 2017-03-08 NOTE — PROGRESS NOTES
Leah Floor   ([facility]:0/16/3208) [address] at  [address], [address] [place] [address]  [phone]  [phone]                Outpatient PHYSICAL THERAPY: Daily Note and Recertification  Fall Risk Score: 7 (? 5 = High Risk)    ICD-10: Treatment Diagnosis: Difficulty walking, not elsewhere classified (R26.2), Muscle weakness, generalized (M62.81), Pain in right wrist (M25.531)    DATE: 3/8/2017  REFERRING PHYSICIAN:  Robb Clemente MD MD Orders: evaluate and treat  Return Physician Appointment: TBD  MEDICAL/REFERRING DIAGNOSIS: LE weakness, right wrist pain  DATE OF ONSET: 9/27/16   PRIOR LEVEL OF FUNCTION: independent  PRECAUTIONS/ALLERGIES:   Allergies   Allergen Reactions    Coenzyme Q10 Rash    Niacin (Inositol Niacinate) Rash    Adhesive Tape-Silicones Rash     sts paper tape    Ambien [Zolpidem] Other (comments)     nightmares    Keflex [Cephalexin] Rash    Oxycodone Other (comments)     Slurred speech, hallucinations, droopy face, word finding issues    Pcn [Penicillins] Rash    Ramipril Other (comments)     nightmares       ASSESSMENT:  ?????? ? ? This section established at most recent assessment?????????? Patient is a 77 y.o. female who presents to physical therapy with difficulty walking, decreased balance and history of recent fall which resulted in right wrist injury. She also presents with weakness and decreased mobility which limits her ability to perform ADL without assistance and places patient at risk for falls in the future. Patient would benefit from skilled physical therapy to improve overall mobility and function. 3/8/17: Patient has made steady but slower than expected progress over the first few attended therapy visits. She has improved her walking tolerance and strength but still remains limited by decreased functional mobility and endurance which limits her ability to perform ADL tasks without assistance from others.  She will continue to benefit from skilled therapy to maximize her strength and mobility to reduce her risk of falls and return to highest level of functional independence possible. PROBLEM LIST (Impairments causing functional limitations):  1. Decreased Strength affecting function  2. Decreased ADL/Functional Activities  3. Decreased Flexibility/joint mobility  4. Increased Pain affecting function  5. Decreased Activity tolerance   GOALS: (Goals have been discussed and agreed upon with patient.)  SHORT-TERM FUNCTIONAL GOALS: Time Frame: 6 weeks  1. Patient demonstrates independence with home exercise program without verbal cueing provided by therapist. MET  2. Patient will perform 5 minutes or greater of standing functional tasks to improve ability to cook with less difficulty. ONGOING  DISCHARGE GOALS: Time Frame: 12 weeks  1. Patient will report LEFS score of 45/80 or greater to demonstrate improved LE function. ONGOING  2. Patient will decrease TUG score to less than 13 seconds to reduce risk of future falls. ONGOING  3. Patient will decrease 5 stair up/down to less than 15 seconds to improve ability to ambulate around her community. ONGOING  4. Patient will improve her dynamic balance by increasing her ability to maintain semi tandem stance for greater than 20 seconds to reduce her risk of future falls. ONGOING  REHABILITATION POTENTIAL FOR STATED GOALS: GoodPLAN OF CARE:INTERVENTIONS PLANNED: (Benefits and precautions of physical therapy have been discussed with the patient)  1. Patient education including pathophysiology of falls and decreased mobility, back education/training (sleeping, sitting and standing positions, posture re-education and body mechanics) and instructions of home exercise program.   2. Therapeutic exercises including strength, mobility and flexibility tasks.    3. Manual therapy including soft tissue mobilizations, functional joint mobilizations and neuromuscular re-education to improve motion and reduce pain. TREATMENT PLAN EFFECTIVE DATES: 3/8/17 TO 5/17/17  FREQUENCY/DURATION: Follow patient 1 times a week for 10 weeks to address above goals. Regarding Navjot Culver's therapy, I certify that the treatment plan above will be carried out by a therapist or under their direction. Thank you for this referral,   Heidy Pizano, PT, DPT   Favian Cabrera MD Signature: ______________________________________ Date:_____________                     SUBJECTIVE:    History of Present Injury/Illness (Reason for Referral): Marvin Freitas presents with difficulty walking, weakness and decreased balance. She also has had recent fall which resulted in right wrist fracture which required surgery to repair. She is well known to therapist having undergone previous therapy bout to help improve mobility and balance when her fall occurred. Her goals are to improve her leg strength and not fall any more. Aggravating factors: cooking, doing dishes, lifting Relieving factors: medicine, heat    Present Symptoms: Patient says she was able to do more of her exercises the past few days. Pain Intensity 1: 0  Dominant Side: right  Past Medical History: Ms. Minnie Felton  has a past medical history of Acute renal failure [facility]) (June, 2008); Arthritis; Asthma; CAD (coronary artery disease); COPD (chronic obstructive pulmonary disease) ([facility] [address] Deep vein thrombosis of lower leg [facility]) (June, 2008); Diabetes ([facility] [address] Diabetes mellitus ([facility] [address] Dislocation of right shoulder joint; Dyspnea on exertion (October, 2010); GERD (gastroesophageal reflux disease); High cholesterol; Hypertension; Hypothyroid; Morbid obesity ([address] Myocardial infarction ([address] (1991); Osteomyelitis of left foot (HCC) (???); Restless leg syndrome; and Sleep apnea. She also has no past medical history of Aneurysm ([address] Arrhythmia; Autoimmune disease ([address] Cancer ([address] Chronic kidney disease; Coagulation defects; DEMENTIA; Difficult intubation;  Infectious disease; Liver disease; Malignant hyperthermia due to anesthesia; Nausea & vomiting; Neurological disorder; Pseudocholinesterase deficiency; Psychiatric disorder; PUD (peptic ulcer disease); Stroke [facility]); or Thyroid disease. She also  has a past surgical history that includes tonsil and adenoidectomy (Childhood); coronary stent placement (1997 & 2010); hernia repair (2006); breast biopsy (2000); tubal ligation (1977); tonsillectomy; adenoidectomy; cardiac surg procedure unlist; knee replacement (May, 2008); orthopaedic; and orthopaedic. Current Medications:   Current Outpatient Prescriptions on File Prior to Encounter   Medication Sig Dispense Refill    magnesium oxide (MAG-OX) 400 mg tablet Take 400 mg by mouth daily.  insulin glargine (LANTUS) 100 unit/mL injection 50 Units by SubCUTAneous route every morning.  insulin glargine (LANTUS) 100 unit/mL injection 30 Units by SubCUTAneous route nightly.  insulin regular (NOVOLIN R, HUMULIN R) 100 unit/mL injection by SubCUTAneous route. Regular Insulin 8 units before breakfast and 8 units before lunch and 10 units at bedtime      oxybutynin chloride XL 10 mg CR tablet Take 10 mg by mouth daily. Take / use AM day of surgery with a sip of water per anesthesia protocols. Indications: \"BLADDER\"      amLODIPine (NORVASC) 5 mg tablet Take 5 mg by mouth daily. Take / use AM day of surgery with a sip of water per anesthesia protocols. Indications: HYPERTENSION      metoprolol-XL (TOPROL-XL) 100 mg XL tablet Take 100 mg by mouth daily. Take / use AM day of surgery with a sip of water per anesthesia protocols. Indications: HYPERTENSION      prasugrel (EFFIENT) 10 mg tablet Take 10 mg by mouth daily. Dr Satya Hurtado and Dr Reuben Thompson aware that pt is continuing medication prior to surgery. Indications: THROMBOSIS PREVENTION AFTER PCI      aspirin (ASPIRIN) 325 mg tablet Take 325 mg by mouth every morning.       valsartan-hydrochlorothiazide (DIOVAN-HCT) 320-25 mg per tablet Take 1 Tab by mouth daily. Indications: HYPERTENSION      levothyroxine (SYNTHROID) 50 mcg tablet Take 50 mcg by mouth Daily (before breakfast). Take / use AM day of surgery with a sip of water per anesthesia protocols. Indications: HYPOTHYROIDISM      budesonide-formoterol (SYMBICORT) 160-4.5 mcg/Actuation HFA inhaler Take 2 Puffs by inhalation two (2) times a day. Take / use AM day of surgery with a sip of water per anesthesia protocols.  atorvastatin (LIPITOR) 80 mg tablet Take 80 mg by mouth every evening. Indications: HYPERCHOLESTEROLEMIA      furosemide (LASIX) 20 mg tablet Take 20 mg by mouth daily. Indications: EDEMA      ezetimibe (ZETIA) 10 mg tablet Take 10 mg by mouth every evening. Indications: HYPERCHOLESTEROLEMIA      albuterol (PROVENTIL, VENTOLIN) 90 mcg/Actuation inhaler Take 2 Puffs by inhalation every four (4) hours as needed for Shortness of Breath.  fluoxetine (PROZAC) 20 mg Tab Take 20 mg by mouth daily. Take / use AM day of surgery with a sip of water per anesthesia protocols. No current facility-administered medications on file prior to encounter. Date Last Reviewed:3/8/2017  Social History/Home Situation:  Lives in a private home setting. No physical barriers present in home setting at this time. Work/Activity History: Retired  Quality of Life: Patient rates her quality of life as good. OBJECTIVE:    Outcome Measure: Tool Used: Lower Extremity Functional Scale (LEFS)  Score:  Initial: 14/80 (12/1/16) Most Recent: 20/80 (Date:3/8/17)   Interpretation of Score: 20 questions each scored on a 5 point scale with 0 representing \"extreme difficulty or unable to perform\" and 4 representing \"no difficulty\". The lower the score, the greater the functional disability. 80/80 represents no disability. Minimal detectable change is 9 points. Score 80 79-63 62-48 47-32 31-16 15-1 0   Modifier CH CI CJ CK CL CM CN     ?  Mobility - Walking and Moving Around:     - CURRENT STATUS: CL - 60%-79% impaired, limited or restricted    - GOAL STATUS:  CK - 40%-59% impaired, limited or restricted    - D/C STATUS:  ---------------To be determined---------------       Strength:       RIGHT LEFT    Knee extension  4+/5 4+/5    Knee flexion 4+/5 4+/5    Hip extension  4/5 4/5    Hip abduction 4/5 4/5    Hip flexion 4/5 4/5                      Functional Mobility:            Timed Up and Go 14.22 seconds with rollator     Stair (5 steps) 28.47 with min A from therapist to steady    Sit to stands (5 reps) 11.11 sec (21 in chair height)    Sit to stands (30 sec) 14 repetitions (21 in chair height)    Functional Activity Tolerance 2 minutes           Observation/Gait Assessment:  Patient ambulates with rollator with decreased step length, heavy UE reliance on AD and forward trunk lean. Observable fatigue noted with approximately 50 ft of continuous walking. Balance:     - Semi Tandem Stance: Right- 19.23 sec, Left- 13.37 sec   - Tandem stance: Unable   - Single leg Stance: Unable     Objective data as of: 3/8/17  TREATMENT:    (In addition to Assessment/Re-Assessment sessions the following treatments were rendered)  Therapeutic Exercise: (45 Minutes):  Exercises per grid below to improve mobility, strength and balance. Required moderate visual, verbal and manual cues to promote proper body mechanics. Progressed resistance, range, repetitions and complexity of movement as indicated.   (used abbreviations BET- back education training) Date:  2/15/17 Date:  2/22/17 Date:  3/8/17   Activity/Exercise Parameters Parameters Parameters   Patient Education Review HEP Review HEP Update HEP   LAQ 2 min x 2 2 min x 2, #5 1 min x 3, #2.5   Sit to stand  2 x 10 2 x 10 With bicep curl, #4, 2 x 10   Kitchen sink balance Marches, 1 min x 2 Side steps around table x 2 min --   Walking bouts 1:17, 1:27 2:04, 1:45 1:36, 1:12   Stair taps -- -- --   Standing trials -- -- --   Bike 8 min 6 min 8 min [place] -- -- --   Seated march, heel raise 1 min x 3 each 2 min each, #5 1 min x 3, #2.5       Manual Therapy (     ):   For improved ROM and mobility:    · To be provided next visit     (Used abbreviations: MET - muscle energy technique; PNF - proprioceptive neuromuscular facilitation; NMR - neuromuscular re-education; a/p - anterior to posterior; p/a - posterior to anterior, FMP - functional movement patterns)  Therapeutic Modalities: (for pain and inflammation)                                                                                              HEP: As above; handouts given to patient for all exercises. ______________________________________________________________________________________________________    Treatment Assessment:  Patient continues to make slow but steady progress. Extensive education provided on benefits of more consistent HEP performance to continue to improve overall functional mobility and endurance. Patient reports 0/10 pain at end of today's visit. Progression/Medical Necessity:   · Patient is expected to demonstrate progress in strength, range of motion and balance to increase independence with ADL tasks. Compliance with Program/Exercises: compliant most of the time. Reason for Continuation of Services/Other Comments:  · Patient continues to require present interventions due to patient's inability to cook, clean and walk without difficulty. Recommendations/Intent for next treatment session: Continue to challenge dynamic balance and strength.      Total Treatment Duration: 45 minutes   PT Patient Time In/Time Out  Time In: 8585  Time Out: Adrienne Boogie 34, PT, DPT

## 2017-03-15 ENCOUNTER — HOSPITAL ENCOUNTER (OUTPATIENT)
Dept: PHYSICAL THERAPY | Age: 77
Discharge: HOME | End: 2017-03-15
Payer: MEDICARE

## 2017-03-15 NOTE — PROGRESS NOTES
Therapy Center at [address] [place] [address] Adore Erazo   [phone]   [phone]    DATE: 3/15/2017    Patient cancelled appointment today due to schedule conflict. Will plan to follow up on next scheduled visit.       Spero Prader, PT, DPT

## 2017-03-22 ENCOUNTER — HOSPITAL ENCOUNTER (OUTPATIENT)
Dept: PHYSICAL THERAPY | Age: 77
Discharge: HOME | End: 2017-03-22
Payer: MEDICARE

## 2017-03-22 PROCEDURE — 97110 THERAPEUTIC EXERCISES: CPT

## 2017-03-22 NOTE — PROGRESS NOTES
Jing Mcelroy   ([id number]:[id number]) [address]  at  [address], [address] [place] [address]  [phone]  [phone]                Outpatient PHYSICAL THERAPY: Daily Note  Fall Risk Score: 7 (? 5 = High Risk)    ICD-10: Treatment Diagnosis: Difficulty walking, not elsewhere classified (R26.2), Muscle weakness, generalized (M62.81), Pain in right wrist (M25.531)    DATE: 3/22/2017  REFERRING PHYSICIAN:  Stacey Quinones MD MD Orders: evaluate and treat  Return Physician Appointment: TBD  MEDICAL/REFERRING DIAGNOSIS: LE weakness, right wrist pain  DATE OF ONSET: 9/27/16   PRIOR LEVEL OF FUNCTION: independent  PRECAUTIONS/ALLERGIES:   Allergies   Allergen Reactions    Coenzyme Q10 Rash    Niacin (Inositol Niacinate) Rash    Adhesive Tape-Silicones Rash     sts paper tape    Ambien [Zolpidem] Other (comments)     nightmares    Keflex [Cephalexin] Rash    Oxycodone Other (comments)     Slurred speech, hallucinations, droopy face, word finding issues    Pcn [Penicillins] Rash    Ramipril Other (comments)     nightmares       ASSESSMENT:  ?????? ? ? This section established at most recent assessment?????????? Patient is a 77 y.o. female who presents to physical therapy with difficulty walking, decreased balance and history of recent fall which resulted in right wrist injury. She also presents with weakness and decreased mobility which limits her ability to perform ADL without assistance and places patient at risk for falls in the future. Patient would benefit from skilled physical therapy to improve overall mobility and function. 3/8/17: Patient has made steady but slower than expected progress over the first few attended therapy visits. She has improved her walking tolerance and strength but still remains limited by decreased functional mobility and endurance which limits her ability to perform ADL tasks without assistance from others.  She will continue to benefit from skilled therapy to maximize her strength and mobility to reduce her risk of falls and return to highest level of functional independence possible. PROBLEM LIST (Impairments causing functional limitations):  1. Decreased Strength affecting function  2. Decreased ADL/Functional Activities  3. Decreased Flexibility/joint mobility  4. Increased Pain affecting function  5. Decreased Activity tolerance   GOALS: (Goals have been discussed and agreed upon with patient.)  SHORT-TERM FUNCTIONAL GOALS: Time Frame: 6 weeks  1. Patient demonstrates independence with home exercise program without verbal cueing provided by therapist. MET  2. Patient will perform 5 minutes or greater of standing functional tasks to improve ability to cook with less difficulty. ONGOING  DISCHARGE GOALS: Time Frame: 12 weeks  1. Patient will report LEFS score of 45/80 or greater to demonstrate improved LE function. ONGOING  2. Patient will decrease TUG score to less than 13 seconds to reduce risk of future falls. ONGOING  3. Patient will decrease 5 stair up/down to less than 15 seconds to improve ability to ambulate around her community. ONGOING  4. Patient will improve her dynamic balance by increasing her ability to maintain semi tandem stance for greater than 20 seconds to reduce her risk of future falls. ONGOING  REHABILITATION POTENTIAL FOR STATED GOALS: GoodPLAN OF CARE:INTERVENTIONS PLANNED: (Benefits and precautions of physical therapy have been discussed with the patient)  1. Patient education including pathophysiology of falls and decreased mobility, back education/training (sleeping, sitting and standing positions, posture re-education and body mechanics) and instructions of home exercise program.   2. Therapeutic exercises including strength, mobility and flexibility tasks.    3. Manual therapy including soft tissue mobilizations, functional joint mobilizations and neuromuscular re-education to improve motion and reduce pain.  TREATMENT PLAN EFFECTIVE DATES: 3/8/17 TO 5/17/17  FREQUENCY/DURATION: Follow patient 1 times a week for 10 weeks to address above goals. SUBJECTIVE:    History of Present Injury/Illness (Reason for Referral): Fabiana Hodges presents with difficulty walking, weakness and decreased balance. She also has had recent fall which resulted in right wrist fracture which required surgery to repair. She is well known to therapist having undergone previous therapy bout to help improve mobility and balance when her fall occurred. Her goals are to improve her leg strength and not fall any more. Aggravating factors: cooking, doing dishes, lifting Relieving factors: medicine, heat    Present Symptoms: Patient says she is feeling more dizzy today. Pain Intensity 1: 0  Dominant Side: right  Past Medical History: Ms. Sudeep Forbes  has a past medical history of Acute renal failure [facility]) (June, 2008); Arthritis; Asthma; CAD (coronary artery disease); COPD (chronic obstructive pulmonary disease) ([facility] Deep vein thrombosis of lower leg [facility]) (June, 2008); Diabetes ([facility] Diabetes mellitus ([facility] Dislocation of right shoulder joint; Dyspnea on exertion (October, 2010); GERD (gastroesophageal reflux disease); High cholesterol; Hypertension; Hypothyroid; Morbid obesity ([address] Myocardial infarction ([address] (1991); Osteomyelitis of left foot (HCC) (???); Restless leg syndrome; and Sleep apnea. She also has no past medical history of Aneurysm ([facility] Arrhythmia; Autoimmune disease ([address] Cancer ([facility] Chronic kidney disease; Coagulation defects; DEMENTIA; Difficult intubation; Infectious disease; Liver disease; Malignant hyperthermia due to anesthesia; Nausea & vomiting; Neurological disorder; Pseudocholinesterase deficiency; Psychiatric disorder; PUD (peptic ulcer disease); Stroke [facility]); or Thyroid disease.   She also  has a past surgical history that includes tonsil and adenoidectomy (Childhood); coronary stent placement (1997 & 2010); hernia repair (2006); breast biopsy (2000); tubal ligation (1977); tonsillectomy; adenoidectomy; cardiac surg procedure unlist; knee replacement (May, 2008); orthopaedic; and orthopaedic. Current Medications:   Current Outpatient Prescriptions on File Prior to Encounter   Medication Sig Dispense Refill    magnesium oxide (MAG-OX) 400 mg tablet Take 400 mg by mouth daily.  insulin glargine (LANTUS) 100 unit/mL injection 50 Units by SubCUTAneous route every morning.  insulin glargine (LANTUS) 100 unit/mL injection 30 Units by SubCUTAneous route nightly.  insulin regular (NOVOLIN R, HUMULIN R) 100 unit/mL injection by SubCUTAneous route. Regular Insulin 8 units before breakfast and 8 units before lunch and 10 units at bedtime      oxybutynin chloride XL 10 mg CR tablet Take 10 mg by mouth daily. Take / use AM day of surgery with a sip of water per anesthesia protocols. Indications: \"BLADDER\"      amLODIPine (NORVASC) 5 mg tablet Take 5 mg by mouth daily. Take / use AM day of surgery with a sip of water per anesthesia protocols. Indications: HYPERTENSION      metoprolol-XL (TOPROL-XL) 100 mg XL tablet Take 100 mg by mouth daily. Take / use AM day of surgery with a sip of water per anesthesia protocols. Indications: HYPERTENSION      prasugrel (EFFIENT) 10 mg tablet Take 10 mg by mouth daily. Dr Rico Rodarte and Dr Nanci Frye aware that pt is continuing medication prior to surgery. Indications: THROMBOSIS PREVENTION AFTER PCI      aspirin (ASPIRIN) 325 mg tablet Take 325 mg by mouth every morning.  valsartan-hydrochlorothiazide (DIOVAN-HCT) 320-25 mg per tablet Take 1 Tab by mouth daily. Indications: HYPERTENSION      levothyroxine (SYNTHROID) 50 mcg tablet Take 50 mcg by mouth Daily (before breakfast). Take / use AM day of surgery with a sip of water per anesthesia protocols.    Indications: HYPOTHYROIDISM      budesonide-formoterol (SYMBICORT) 160-4.5 mcg/Actuation HFA inhaler Take 2 Puffs by inhalation two (2) times a day. Take / use AM day of surgery with a sip of water per anesthesia protocols.  atorvastatin (LIPITOR) 80 mg tablet Take 80 mg by mouth every evening. Indications: HYPERCHOLESTEROLEMIA      furosemide (LASIX) 20 mg tablet Take 20 mg by mouth daily. Indications: EDEMA      ezetimibe (ZETIA) 10 mg tablet Take 10 mg by mouth every evening. Indications: HYPERCHOLESTEROLEMIA      albuterol (PROVENTIL, VENTOLIN) 90 mcg/Actuation inhaler Take 2 Puffs by inhalation every four (4) hours as needed for Shortness of Breath.  fluoxetine (PROZAC) 20 mg Tab Take 20 mg by mouth daily. Take / use AM day of surgery with a sip of water per anesthesia protocols. No current facility-administered medications on file prior to encounter. Date Last Reviewed:3/22/2017  Social History/Home Situation:  Lives in a private home setting. No physical barriers present in home setting at this time. Work/Activity History: Retired  Quality of Life: Patient rates her quality of life as good. OBJECTIVE:    Outcome Measure: Tool Used: Lower Extremity Functional Scale (LEFS)  Score:  Initial: 14/80 (12/1/16) Most Recent: 20/80 (Date:3/8/17)   Interpretation of Score: 20 questions each scored on a 5 point scale with 0 representing \"extreme difficulty or unable to perform\" and 4 representing \"no difficulty\". The lower the score, the greater the functional disability. 80/80 represents no disability. Minimal detectable change is 9 points. Score 80 79-63 62-48 47-32 31-16 15-1 0   Modifier CH CI CJ CK CL CM CN     ?  Mobility - Walking and Moving Around:     - CURRENT STATUS: CL - 60%-79% impaired, limited or restricted    - GOAL STATUS:  CK - 40%-59% impaired, limited or restricted    - D/C STATUS:  ---------------To be determined---------------       Strength:       RIGHT LEFT    Knee extension  4+/5 4+/5    Knee flexion 4+/5 4+/5    Hip extension  4/5 4/5    Hip abduction 4/5 4/5    Hip flexion 4/5 4/5                      Functional Mobility:            Timed Up and Go 14.22 seconds with rollator     Stair (5 steps) 28.47 with min A from therapist to steady    Sit to stands (5 reps) 11.11 sec (21 in chair height)    Sit to stands (30 sec) 14 repetitions (21 in chair height)    Functional Activity Tolerance 2 minutes           Observation/Gait Assessment:  Patient ambulates with rollator with decreased step length, heavy UE reliance on AD and forward trunk lean. Observable fatigue noted with approximately 50 ft of continuous walking. Balance:     - Semi Tandem Stance: Right- 19.23 sec, Left- 13.37 sec   - Tandem stance: Unable   - Single leg Stance: Unable     Objective data as of: 3/8/17  TREATMENT:    (In addition to Assessment/Re-Assessment sessions the following treatments were rendered)  Therapeutic Exercise: (45 Minutes):  Exercises per grid below to improve mobility, strength and balance. Required moderate visual, verbal and manual cues to promote proper body mechanics. Progressed resistance, range, repetitions and complexity of movement as indicated.   (used abbreviations BET- back education training) Date:  2/22/17 Date:  3/8/17 Date:  3/22/17   Activity/Exercise Parameters Parameters Parameters   Patient Education Review HEP Update HEP Review HEP   LAQ 2 min x 2, #5 1 min x 3, #2.5 1 min x 3   Sit to stand  2 x 10 With bicep curl, #4, 2 x 10 3 x 10   Kitchen sink balance Side steps around table x 2 min -- --   Walking bouts 2:04, 1:45 1:36, 1:12 1:35, 1:00, 1:25   Stair taps -- -- --   Standing trials -- -- 2 min x 2 with UE tasks   Bike 6 min 8 min 8 min   [place] -- -- --   Seated march, heel raise 2 min each, #5 1 min x 3, #2.5 1 min x 3       Manual Therapy (     ):   For improved ROM and mobility:    · To be provided next visit     (Used abbreviations: MET - muscle energy technique; PNF - proprioceptive neuromuscular facilitation; NMR - neuromuscular re-education; a/p - anterior to posterior; p/a - posterior to anterior, FMP - functional movement patterns)  Therapeutic Modalities: (for pain and inflammation)                                                                                              HEP: As above; handouts given to patient for all exercises. ______________________________________________________________________________________________________    Treatment Assessment:  Patient ambulation bouts and distance limited by complaints of dizziness but no LOB observed during bouts today. Patient and patient's daughter again encouraged for more consistent HEP performance between therapy visits. Patient reports 0/10 pain at end of today's visit. Progression/Medical Necessity:   · Patient is expected to demonstrate progress in strength, range of motion and balance to increase independence with ADL tasks. Compliance with Program/Exercises: compliant most of the time. Reason for Continuation of Services/Other Comments:  · Patient continues to require present interventions due to patient's inability to cook, clean and walk without difficulty. Recommendations/Intent for next treatment session: Continue to challenge dynamic balance and strength.      Total Treatment Duration: 45 minutes   PT Patient Time In/Time Out  Time In: 1430  Time Out: 1215 [facility], PT, DPT

## 2017-03-29 ENCOUNTER — HOSPITAL ENCOUNTER (OUTPATIENT)
Dept: PHYSICAL THERAPY | Age: 77
Discharge: HOME | End: 2017-03-29
Payer: MEDICARE

## 2017-03-29 NOTE — PROGRESS NOTES
Therapy Center at [address] [place] [address] [place] [address]  [phone]   [phone]    DATE: 3/29/2017    Patient cancelled appointment today. Will plan to follow up on next scheduled visit.       Lele Polk, PT, DPT

## 2017-04-04 ENCOUNTER — APPOINTMENT (OUTPATIENT)
Dept: PHYSICAL THERAPY | Age: 77
End: 2017-04-04
Payer: MEDICARE

## 2017-04-12 ENCOUNTER — HOSPITAL ENCOUNTER (OUTPATIENT)
Dept: PHYSICAL THERAPY | Age: 77
Discharge: HOME | End: 2017-04-12
Payer: MEDICARE

## 2017-04-12 PROCEDURE — 97110 THERAPEUTIC EXERCISES: CPT

## 2017-04-12 NOTE — PROGRESS NOTES
Himanshu Huber   (YBI:[id number]) [facility] at  [address]  [phone]  [phone]                Outpatient PHYSICAL THERAPY: Daily Note  Fall Risk Score: 7 (? 5 = High Risk)    ICD-10: Treatment Diagnosis: Difficulty walking, not elsewhere classified (R26.2), Muscle weakness, generalized (M62.81), Pain in right wrist (M25.531)    DATE: 4/12/2017  REFERRING PHYSICIAN:  Arianne Suarez MD MD Orders: evaluate and treat  Return Physician Appointment: TBD  MEDICAL/REFERRING DIAGNOSIS: LE weakness, right wrist pain  DATE OF ONSET: 9/27/16   PRIOR LEVEL OF FUNCTION: independent  PRECAUTIONS/ALLERGIES:   Allergies   Allergen Reactions    Coenzyme Q10 Rash    Niacin (Inositol Niacinate) Rash    Adhesive Tape-Silicones Rash     sts paper tape    Ambien [Zolpidem] Other (comments)     nightmares    Keflex [Cephalexin] Rash    Oxycodone Other (comments)     Slurred speech, hallucinations, droopy face, word finding issues    Pcn [Penicillins] Rash    Ramipril Other (comments)     nightmares       ASSESSMENT:  ?????? ? ? This section established at most recent assessment?????????? Patient is a 77 y.o. female who presents to physical therapy with difficulty walking, decreased balance and history of recent fall which resulted in right wrist injury. She also presents with weakness and decreased mobility which limits her ability to perform ADL without assistance and places patient at risk for falls in the future. Patient would benefit from skilled physical therapy to improve overall mobility and function. 3/8/17: Patient has made steady but slower than expected progress over the first few attended therapy visits. She has improved her walking tolerance and strength but still remains limited by decreased functional mobility and endurance which limits her ability to perform ADL tasks without assistance from others.  She will continue to benefit from skilled therapy to maximize her strength and mobility to reduce her risk of falls and return to highest level of functional independence possible. PROBLEM LIST (Impairments causing functional limitations):  1. Decreased Strength affecting function  2. Decreased ADL/Functional Activities  3. Decreased Flexibility/joint mobility  4. Increased Pain affecting function  5. Decreased Activity tolerance   GOALS: (Goals have been discussed and agreed upon with patient.)  SHORT-TERM FUNCTIONAL GOALS: Time Frame: 6 weeks  1. Patient demonstrates independence with home exercise program without verbal cueing provided by therapist. MET  2. Patient will perform 5 minutes or greater of standing functional tasks to improve ability to cook with less difficulty. ONGOING  DISCHARGE GOALS: Time Frame: 12 weeks  1. Patient will report LEFS score of 45/80 or greater to demonstrate improved LE function. ONGOING  2. Patient will decrease TUG score to less than 13 seconds to reduce risk of future falls. ONGOING  3. Patient will decrease 5 stair up/down to less than 15 seconds to improve ability to ambulate around her community. ONGOING  4. Patient will improve her dynamic balance by increasing her ability to maintain semi tandem stance for greater than 20 seconds to reduce her risk of future falls. ONGOING  REHABILITATION POTENTIAL FOR STATED GOALS: GoodPLAN OF CARE:INTERVENTIONS PLANNED: (Benefits and precautions of physical therapy have been discussed with the patient)  1. Patient education including pathophysiology of falls and decreased mobility, back education/training (sleeping, sitting and standing positions, posture re-education and body mechanics) and instructions of home exercise program.   2. Therapeutic exercises including strength, mobility and flexibility tasks.    3. Manual therapy including soft tissue mobilizations, functional joint mobilizations and neuromuscular re-education to improve motion and reduce pain.  TREATMENT PLAN EFFECTIVE DATES: 3/8/17 TO 5/17/17  FREQUENCY/DURATION: Follow patient 1 times a week for 10 weeks to address above goals. SUBJECTIVE:    History of Present Injury/Illness (Reason for Referral): Yobani Munroe presents with difficulty walking, weakness and decreased balance. She also has had recent fall which resulted in right wrist fracture which required surgery to repair. She is well known to therapist having undergone previous therapy bout to help improve mobility and balance when her fall occurred. Her goals are to improve her leg strength and not fall any more. Aggravating factors: cooking, doing dishes, lifting Relieving factors: medicine, heat    Present Symptoms: Patient says she did her exercises a few times this week. Pain Intensity 1: 0  Dominant Side: right  Past Medical History: Ms. Makenna Ponce  has a past medical history of Acute renal failure [facility]) (June, 2008); Arthritis; Asthma; CAD (coronary artery disease); COPD (chronic obstructive pulmonary disease) ([facility] Deep vein thrombosis of lower leg [facility]) (June, 2008); Diabetes ([facility] [address] Diabetes mellitus ([facility] [address] Dislocation of right shoulder joint; Dyspnea on exertion (October, 2010); GERD (gastroesophageal reflux disease); High cholesterol; Hypertension; Hypothyroid; Morbid obesity ([address] Myocardial infarction ([address] (1991); Osteomyelitis of left foot (HCC) (???); Restless leg syndrome; and Sleep apnea. She also has no past medical history of Aneurysm ([address] Arrhythmia; Autoimmune disease ([address] Cancer ([address] Chronic kidney disease; Coagulation defects; DEMENTIA; Difficult intubation; Infectious disease; Liver disease; Malignant hyperthermia due to anesthesia; Nausea & vomiting; Neurological disorder; Pseudocholinesterase deficiency; Psychiatric disorder; PUD (peptic ulcer disease); Stroke [facility]); or Thyroid disease.   She also  has a past surgical history that includes tonsil and adenoidectomy (Childhood); coronary stent placement (1997 & 2010); hernia repair (2006); breast biopsy (2000); tubal ligation (1977); tonsillectomy; adenoidectomy; cardiac surg procedure unlist; knee replacement (May, 2008); orthopaedic; and orthopaedic. Current Medications:   Current Outpatient Prescriptions on File Prior to Encounter   Medication Sig Dispense Refill    magnesium oxide (MAG-OX) 400 mg tablet Take 400 mg by mouth daily.  insulin glargine (LANTUS) 100 unit/mL injection 50 Units by SubCUTAneous route every morning.  insulin glargine (LANTUS) 100 unit/mL injection 30 Units by SubCUTAneous route nightly.  insulin regular (NOVOLIN R, HUMULIN R) 100 unit/mL injection by SubCUTAneous route. Regular Insulin 8 units before breakfast and 8 units before lunch and 10 units at bedtime      oxybutynin chloride XL 10 mg CR tablet Take 10 mg by mouth daily. Take / use AM day of surgery with a sip of water per anesthesia protocols. Indications: \"BLADDER\"      amLODIPine (NORVASC) 5 mg tablet Take 5 mg by mouth daily. Take / use AM day of surgery with a sip of water per anesthesia protocols. Indications: HYPERTENSION      metoprolol-XL (TOPROL-XL) 100 mg XL tablet Take 100 mg by mouth daily. Take / use AM day of surgery with a sip of water per anesthesia protocols. Indications: HYPERTENSION      prasugrel (EFFIENT) 10 mg tablet Take 10 mg by mouth daily. Dr Michelle Aguilar and Dr Jonah Babinski aware that pt is continuing medication prior to surgery. Indications: THROMBOSIS PREVENTION AFTER PCI      aspirin (ASPIRIN) 325 mg tablet Take 325 mg by mouth every morning.  valsartan-hydrochlorothiazide (DIOVAN-HCT) 320-25 mg per tablet Take 1 Tab by mouth daily. Indications: HYPERTENSION      levothyroxine (SYNTHROID) 50 mcg tablet Take 50 mcg by mouth Daily (before breakfast). Take / use AM day of surgery with a sip of water per anesthesia protocols.    Indications: HYPOTHYROIDISM      budesonide-formoterol (SYMBICORT) 160-4.5 mcg/Actuation HFA inhaler Take 2 Puffs by inhalation two (2) times a day. Take / use AM day of surgery with a sip of water per anesthesia protocols.  atorvastatin (LIPITOR) 80 mg tablet Take 80 mg by mouth every evening. Indications: HYPERCHOLESTEROLEMIA      furosemide (LASIX) 20 mg tablet Take 20 mg by mouth daily. Indications: EDEMA      ezetimibe (ZETIA) 10 mg tablet Take 10 mg by mouth every evening. Indications: HYPERCHOLESTEROLEMIA      albuterol (PROVENTIL, VENTOLIN) 90 mcg/Actuation inhaler Take 2 Puffs by inhalation every four (4) hours as needed for Shortness of Breath.  fluoxetine (PROZAC) 20 mg Tab Take 20 mg by mouth daily. Take / use AM day of surgery with a sip of water per anesthesia protocols. No current facility-administered medications on file prior to encounter. Date Last Reviewed:4/12/2017  Social History/Home Situation:  Lives in a private home setting. No physical barriers present in home setting at this time. Work/Activity History: Retired  Quality of Life: Patient rates her quality of life as good. OBJECTIVE:    Outcome Measure: Tool Used: Lower Extremity Functional Scale (LEFS)  Score:  Initial: 14/80 (12/1/16) Most Recent: 20/80 (Date:3/8/17)   Interpretation of Score: 20 questions each scored on a 5 point scale with 0 representing \"extreme difficulty or unable to perform\" and 4 representing \"no difficulty\". The lower the score, the greater the functional disability. 80/80 represents no disability. Minimal detectable change is 9 points. Score 80 79-63 62-48 47-32 31-16 15-1 0   Modifier CH CI CJ CK CL CM CN     ?  Mobility - Walking and Moving Around:     - CURRENT STATUS: CL - 60%-79% impaired, limited or restricted    - GOAL STATUS:  CK - 40%-59% impaired, limited or restricted    - D/C STATUS:  ---------------To be determined---------------       Strength:       RIGHT LEFT    Knee extension  4+/5 4+/5    Knee flexion 4+/5 4+/5    Hip extension  4/5 4/5 Hip abduction 4/5 4/5    Hip flexion 4/5 4/5                      Functional Mobility:            Timed Up and Go 14.22 seconds with rollator     Stair (5 steps) 28.47 with min A from therapist to steady    Sit to stands (5 reps) 11.11 sec (21 in chair height)    Sit to stands (30 sec) 14 repetitions (21 in chair height)    Functional Activity Tolerance 2 minutes           Observation/Gait Assessment:  Patient ambulates with rollator with decreased step length, heavy UE reliance on AD and forward trunk lean. Observable fatigue noted with approximately 50 ft of continuous walking. Balance:     - Semi Tandem Stance: Right- 19.23 sec, Left- 13.37 sec   - Tandem stance: Unable   - Single leg Stance: Unable     Objective data as of: 3/8/17  TREATMENT:    (In addition to Assessment/Re-Assessment sessions the following treatments were rendered)  Therapeutic Exercise: (45 Minutes):  Exercises per grid below to improve mobility, strength and balance. Required moderate visual, verbal and manual cues to promote proper body mechanics. Progressed resistance, range, repetitions and complexity of movement as indicated.   (used abbreviations BET- back education training) Date:  3/8/17 Date:  3/22/17 Date:  4/12/17   Activity/Exercise Parameters Parameters Parameters   Patient Education Update HEP Review HEP Update HEP   LAQ 1 min x 3, #2.5 1 min x 3 1 min x 4   Sit to stand  With bicep curl, #4, 2 x 10 3 x 10 2 x 10   Kitchen sink balance -- -- --   Walking bouts 1:36, 1:12 1:35, 1:00, 1:25 45 sec bouts x 5   Stair taps -- -- --   Standing trials -- 2 min x 2 with UE tasks --   Bike 8 min 8 min 7 min   [place] -- -- --   Seated march, heel raise 1 min x 3, #2.5 1 min x 3 1 min x 4 each        Manual Therapy (     ):   For improved ROM and mobility:    · To be provided next visit     (Used abbreviations: MET - muscle energy technique; PNF - proprioceptive neuromuscular facilitation; NMR - neuromuscular re-education; a/p - anterior to posterior; p/a - posterior to anterior, FMP - functional movement patterns)  Therapeutic Modalities: (for pain and inflammation)                                                                                              HEP: As above; handouts given to patient for all exercises. ______________________________________________________________________________________________________    Treatment Assessment:  Patient able to perform more short distance walking bouts with equal work to rest time today without fatigue being limiting factor of performance when normally limits her ambulation time or distance with prolonnged bouts. Patient reports 0/10 pain at end of today's visit. Progression/Medical Necessity:   · Patient is expected to demonstrate progress in strength, range of motion and balance to increase independence with ADL tasks. Compliance with Program/Exercises: compliant most of the time. Reason for Continuation of Services/Other Comments:  · Patient continues to require present interventions due to patient's inability to cook, clean and walk without difficulty. Recommendations/Intent for next treatment session: Continue to challenge dynamic balance and strength.      Total Treatment Duration: 45 minutes   PT Patient Time In/Time Out  Time In: 1430  Time Out: 1215 [facility], PT, DPT

## 2017-04-19 ENCOUNTER — HOSPITAL ENCOUNTER (OUTPATIENT)
Dept: PHYSICAL THERAPY | Age: 77
Discharge: HOME | End: 2017-04-19
Payer: MEDICARE

## 2017-04-19 PROCEDURE — 97110 THERAPEUTIC EXERCISES: CPT

## 2017-04-20 NOTE — PROGRESS NOTES
Suzie Potter   (LXN:[id number]) [address]  at  [address]  [phone]  [phone]                Outpatient PHYSICAL THERAPY: Daily Note  Fall Risk Score: 7 (? 5 = High Risk)    ICD-10: Treatment Diagnosis: Difficulty walking, not elsewhere classified (R26.2), Muscle weakness, generalized (M62.81), Pain in right wrist (M25.531)    DATE: 4/19/2017  REFERRING PHYSICIAN:  Patricia Zacarias MD MD Orders: evaluate and treat  Return Physician Appointment: TBD  MEDICAL/REFERRING DIAGNOSIS: LE weakness, right wrist pain  DATE OF ONSET: 9/27/16   PRIOR LEVEL OF FUNCTION: independent  PRECAUTIONS/ALLERGIES:   Allergies   Allergen Reactions    Coenzyme Q10 Rash    Niacin (Inositol Niacinate) Rash    Adhesive Tape-Silicones Rash     sts paper tape    Ambien [Zolpidem] Other (comments)     nightmares    Keflex [Cephalexin] Rash    Oxycodone Other (comments)     Slurred speech, hallucinations, droopy face, word finding issues    Pcn [Penicillins] Rash    Ramipril Other (comments)     nightmares       ASSESSMENT:  ?????? ? ? This section established at most recent assessment?????????? Patient is a 77 y.o. female who presents to physical therapy with difficulty walking, decreased balance and history of recent fall which resulted in right wrist injury. She also presents with weakness and decreased mobility which limits her ability to perform ADL without assistance and places patient at risk for falls in the future. Patient would benefit from skilled physical therapy to improve overall mobility and function. 3/8/17: Patient has made steady but slower than expected progress over the first few attended therapy visits. She has improved her walking tolerance and strength but still remains limited by decreased functional mobility and endurance which limits her ability to perform ADL tasks without assistance from others.  She will continue to benefit from skilled therapy to maximize her strength and mobility to reduce her risk of falls and return to highest level of functional independence possible. PROBLEM LIST (Impairments causing functional limitations):  1. Decreased Strength affecting function  2. Decreased ADL/Functional Activities  3. Decreased Flexibility/joint mobility  4. Increased Pain affecting function  5. Decreased Activity tolerance   GOALS: (Goals have been discussed and agreed upon with patient.)  SHORT-TERM FUNCTIONAL GOALS: Time Frame: 6 weeks  1. Patient demonstrates independence with home exercise program without verbal cueing provided by therapist. MET  2. Patient will perform 5 minutes or greater of standing functional tasks to improve ability to cook with less difficulty. ONGOING  DISCHARGE GOALS: Time Frame: 12 weeks  1. Patient will report LEFS score of 45/80 or greater to demonstrate improved LE function. ONGOING  2. Patient will decrease TUG score to less than 13 seconds to reduce risk of future falls. ONGOING  3. Patient will decrease 5 stair up/down to less than 15 seconds to improve ability to ambulate around her community. ONGOING  4. Patient will improve her dynamic balance by increasing her ability to maintain semi tandem stance for greater than 20 seconds to reduce her risk of future falls. ONGOING  REHABILITATION POTENTIAL FOR STATED GOALS: GoodPLAN OF CARE:INTERVENTIONS PLANNED: (Benefits and precautions of physical therapy have been discussed with the patient)  1. Patient education including pathophysiology of falls and decreased mobility, back education/training (sleeping, sitting and standing positions, posture re-education and body mechanics) and instructions of home exercise program.   2. Therapeutic exercises including strength, mobility and flexibility tasks.    3. Manual therapy including soft tissue mobilizations, functional joint mobilizations and neuromuscular re-education to improve motion and reduce pain.  TREATMENT PLAN EFFECTIVE DATES: 3/8/17 TO 5/17/17  FREQUENCY/DURATION: Follow patient 1 times a week for 10 weeks to address above goals. SUBJECTIVE:    History of Present Injury/Illness (Reason for Referral): Jazmyn Braden presents with difficulty walking, weakness and decreased balance. She also has had recent fall which resulted in right wrist fracture which required surgery to repair. She is well known to therapist having undergone previous therapy bout to help improve mobility and balance when her fall occurred. Her goals are to improve her leg strength and not fall any more. Aggravating factors: cooking, doing dishes, lifting Relieving factors: medicine, heat    Present Symptoms: Patient says she is feeling good today. Pain Intensity 1: 0  Dominant Side: right  Past Medical History: Ms. Lucero Wiley  has a past medical history of Acute renal failure [facility]) (June, 2008); Arthritis; Asthma; CAD (coronary artery disease); COPD [facility]chronic obstructive [facility] disease) [facility] Deep vein thrombosis of lower leg [facility]) (June, 2008); Diabetes ([facility] Diabetes mellitus ([facility] Dislocation of right shoulder joint; Dyspnea on exertion (October, 2010); GERD (gastroesophageal reflux disease); High cholesterol; Hypertension; Hypothyroid; Morbid obesity ([address] Myocardial infarction ([address] (1991); Osteomyelitis of left foot (HCC) (???); Restless leg syndrome; and Sleep apnea. She also has no past medical history of Aneurysm ([facility] Arrhythmia; Autoimmune disease ([address] Cancer ([facility] Chronic kidney disease; Coagulation defects; DEMENTIA; Difficult intubation; Infectious disease; Liver disease; Malignant hyperthermia due to anesthesia; Nausea & vomiting; Neurological disorder; Pseudocholinesterase deficiency; Psychiatric disorder; PUD (peptic ulcer disease); Stroke [facility]); or Thyroid disease.   She also  has a past surgical history that includes tonsil and adenoidectomy (Childhood); coronary stent placement (1997 & 2010); hernia repair (2006); breast biopsy (2000); tubal ligation (1977); tonsillectomy; adenoidectomy; cardiac surg procedure unlist; knee replacement (May, 2008); orthopaedic; and orthopaedic. Current Medications:   Current Outpatient Prescriptions on File Prior to Encounter   Medication Sig Dispense Refill    magnesium oxide (MAG-OX) 400 mg tablet Take 400 mg by mouth daily.  insulin glargine (LANTUS) 100 unit/mL injection 50 Units by SubCUTAneous route every morning.  insulin glargine (LANTUS) 100 unit/mL injection 30 Units by SubCUTAneous route nightly.  insulin regular (NOVOLIN R, HUMULIN R) 100 unit/mL injection by SubCUTAneous route. Regular Insulin 8 units before breakfast and 8 units before lunch and 10 units at bedtime      oxybutynin chloride XL 10 mg CR tablet Take 10 mg by mouth daily. Take / use AM day of surgery with a sip of water per anesthesia protocols. Indications: \"BLADDER\"      amLODIPine (NORVASC) 5 mg tablet Take 5 mg by mouth daily. Take / use AM day of surgery with a sip of water per anesthesia protocols. Indications: HYPERTENSION      metoprolol-XL (TOPROL-XL) 100 mg XL tablet Take 100 mg by mouth daily. Take / use AM day of surgery with a sip of water per anesthesia protocols. Indications: HYPERTENSION      prasugrel (EFFIENT) 10 mg tablet Take 10 mg by mouth daily. Dr Angely Del Rio and Dr Con Varela aware that pt is continuing medication prior to surgery. Indications: THROMBOSIS PREVENTION AFTER PCI      aspirin (ASPIRIN) 325 mg tablet Take 325 mg by mouth every morning.  valsartan-hydrochlorothiazide (DIOVAN-HCT) 320-25 mg per tablet Take 1 Tab by mouth daily. Indications: HYPERTENSION      levothyroxine (SYNTHROID) 50 mcg tablet Take 50 mcg by mouth Daily (before breakfast). Take / use AM day of surgery with a sip of water per anesthesia protocols.    Indications: HYPOTHYROIDISM      budesonide-formoterol (SYMBICORT) 160-4.5 mcg/Actuation HFA inhaler Take 2 Puffs by inhalation two (2) times a day. Take / use AM day of surgery with a sip of water per anesthesia protocols.  atorvastatin (LIPITOR) 80 mg tablet Take 80 mg by mouth every evening. Indications: HYPERCHOLESTEROLEMIA      furosemide (LASIX) 20 mg tablet Take 20 mg by mouth daily. Indications: EDEMA      ezetimibe (ZETIA) 10 mg tablet Take 10 mg by mouth every evening. Indications: HYPERCHOLESTEROLEMIA      albuterol (PROVENTIL, VENTOLIN) 90 mcg/Actuation inhaler Take 2 Puffs by inhalation every four (4) hours as needed for Shortness of Breath.  fluoxetine (PROZAC) 20 mg Tab Take 20 mg by mouth daily. Take / use AM day of surgery with a sip of water per anesthesia protocols. No current facility-administered medications on file prior to encounter. Date Last Reviewed:4/19/2017  Social History/Home Situation:  Lives in a private home setting. No physical barriers present in home setting at this time. Work/Activity History: Retired  Quality of Life: Patient rates her quality of life as good. OBJECTIVE:    Outcome Measure: Tool Used: Lower Extremity Functional Scale (LEFS)  Score:  Initial: 14/80 (12/1/16) Most Recent: 20/80 (Date:3/8/17)   Interpretation of Score: 20 questions each scored on a 5 point scale with 0 representing \"extreme difficulty or unable to perform\" and 4 representing \"no difficulty\". The lower the score, the greater the functional disability. 80/80 represents no disability. Minimal detectable change is 9 points. Score 80 79-63 62-48 47-32 31-16 15-1 0   Modifier CH CI CJ CK CL CM CN     ?  Mobility - Walking and Moving Around:     - CURRENT STATUS: CL - 60%-79% impaired, limited or restricted    - GOAL STATUS:  CK - 40%-59% impaired, limited or restricted    - D/C STATUS:  ---------------To be determined---------------       Strength:       RIGHT LEFT    Knee extension  4+/5 4+/5    Knee flexion 4+/5 4+/5    Hip extension  4/5 4/5    Hip abduction 4/5 4/5    Hip flexion 4/5 4/5                      Functional Mobility:            Timed Up and Go 14.22 seconds with rollator     Stair (5 steps) 28.47 with min A from therapist to steady    Sit to stands (5 reps) 11.11 sec (21 in chair height)    Sit to stands (30 sec) 14 repetitions (21 in chair height)    Functional Activity Tolerance 2 minutes           Observation/Gait Assessment:  Patient ambulates with rollator with decreased step length, heavy UE reliance on AD and forward trunk lean. Observable fatigue noted with approximately 50 ft of continuous walking. Balance:     - Semi Tandem Stance: Right- 19.23 sec, Left- 13.37 sec   - Tandem stance: Unable   - Single leg Stance: Unable     Objective data as of: 3/8/17  TREATMENT:    (In addition to Assessment/Re-Assessment sessions the following treatments were rendered)  Therapeutic Exercise: (45 Minutes):  Exercises per grid below to improve mobility, strength and balance. Required moderate visual, verbal and manual cues to promote proper body mechanics. Progressed resistance, range, repetitions and complexity of movement as indicated.   (used abbreviations BET- back education training) Date:  3/22/17 Date:  4/12/17 Date:  4/19/17   Activity/Exercise Parameters Parameters Parameters   Patient Education Review HEP Update HEP Review HEP   LAQ 1 min x 3 1 min x 4 1 min x 3   Sit to stand  3 x 10 2 x 10 2 x 10 with arm curl   Kitchen sink balance -- -- --   Walking bouts 1:35, 1:00, 1:25 45 sec bouts x 5 45 sec bouts x 5   Stair taps -- -- --   Standing trials 2 min x 2 with UE tasks -- --   Bike 8 min 7 min 8 min   [place] -- -- --   Seated march, heel raise 1 min x 3 1 min x 4 each  1 min x 4        Manual Therapy (     ):   For improved ROM and mobility:    · To be provided next visit     (Used abbreviations: MET - muscle energy technique; PNF - proprioceptive neuromuscular facilitation; NMR - neuromuscular re-education; a/p - anterior to posterior; p/a - posterior to anterior, FMP - functional movement patterns)  Therapeutic Modalities: (for pain and inflammation)                                                                                              HEP: As above; handouts given to patient for all exercises. ______________________________________________________________________________________________________    Treatment Assessment:  Patient with slightly improved activity tolerance today and is better able to perform repeated ambulation bouts without complaints of SOB or fatigue. Patient reports 0/10 pain at end of today's visit. Progression/Medical Necessity:   · Patient is expected to demonstrate progress in strength, range of motion and balance to increase independence with ADL tasks. Compliance with Program/Exercises: compliant most of the time. Reason for Continuation of Services/Other Comments:  · Patient continues to require present interventions due to patient's inability to cook, clean and walk without difficulty. Recommendations/Intent for next treatment session: Continue to challenge dynamic balance and strength.      Total Treatment Duration: 45 minutes   PT Patient Time In/Time Out  Time In: 1430  Time Out: [address] [facility] [address], PT, DPT

## 2017-04-26 ENCOUNTER — HOSPITAL ENCOUNTER (OUTPATIENT)
Dept: PHYSICAL THERAPY | Age: 77
Discharge: HOME | End: 2017-04-26
Payer: MEDICARE

## 2017-04-26 PROCEDURE — 97110 THERAPEUTIC EXERCISES: CPT

## 2017-04-26 NOTE — PROGRESS NOTES
Angelita Sanchez   (SBY:[birth year]) [address]  at  [address], [address] [place] [address]  [phone]  [phone]                Outpatient PHYSICAL THERAPY: Daily Note  Fall Risk Score: 7 (? 5 = High Risk)    ICD-10: Treatment Diagnosis: Difficulty walking, not elsewhere classified (R26.2), Muscle weakness, generalized (M62.81), Pain in right wrist (M25.531)    DATE: 4/26/2017  REFERRING PHYSICIAN:  Zev Keyes MD MD Orders: evaluate and treat  Return Physician Appointment: TBD  MEDICAL/REFERRING DIAGNOSIS: LE weakness, right wrist pain  DATE OF ONSET: 9/27/16   PRIOR LEVEL OF FUNCTION: independent  PRECAUTIONS/ALLERGIES:   Allergies   Allergen Reactions    Coenzyme Q10 Rash    Niacin (Inositol Niacinate) Rash    Adhesive Tape-Silicones Rash     sts paper tape    Ambien [Zolpidem] Other (comments)     nightmares    Keflex [Cephalexin] Rash    Oxycodone Other (comments)     Slurred speech, hallucinations, droopy face, word finding issues    Pcn [Penicillins] Rash    Ramipril Other (comments)     nightmares       ASSESSMENT:  ?????? ? ? This section established at most recent assessment?????????? Patient is a 77 y.o. female who presents to physical therapy with difficulty walking, decreased balance and history of recent fall which resulted in right wrist injury. She also presents with weakness and decreased mobility which limits her ability to perform ADL without assistance and places patient at risk for falls in the future. Patient would benefit from skilled physical therapy to improve overall mobility and function. 3/8/17: Patient has made steady but slower than expected progress over the first few attended therapy visits. She has improved her walking tolerance and strength but still remains limited by decreased functional mobility and endurance which limits her ability to perform ADL tasks without assistance from others.  She will continue to benefit from skilled therapy to maximize her strength and mobility to reduce her risk of falls and return to highest level of functional independence possible. PROBLEM LIST (Impairments causing functional limitations):  1. Decreased Strength affecting function  2. Decreased ADL/Functional Activities  3. Decreased Flexibility/joint mobility  4. Increased Pain affecting function  5. Decreased Activity tolerance   GOALS: (Goals have been discussed and agreed upon with patient.)  SHORT-TERM FUNCTIONAL GOALS: Time Frame: 6 weeks  1. Patient demonstrates independence with home exercise program without verbal cueing provided by therapist. MET  2. Patient will perform 5 minutes or greater of standing functional tasks to improve ability to cook with less difficulty. ONGOING  DISCHARGE GOALS: Time Frame: 12 weeks  1. Patient will report LEFS score of 45/80 or greater to demonstrate improved LE function. ONGOING  2. Patient will decrease TUG score to less than 13 seconds to reduce risk of future falls. ONGOING  3. Patient will decrease 5 stair up/down to less than 15 seconds to improve ability to ambulate around her community. ONGOING  4. Patient will improve her dynamic balance by increasing her ability to maintain semi tandem stance for greater than 20 seconds to reduce her risk of future falls. ONGOING  REHABILITATION POTENTIAL FOR STATED GOALS: GoodPLAN OF CARE:INTERVENTIONS PLANNED: (Benefits and precautions of physical therapy have been discussed with the patient)  1. Patient education including pathophysiology of falls and decreased mobility, back education/training (sleeping, sitting and standing positions, posture re-education and body mechanics) and instructions of home exercise program.   2. Therapeutic exercises including strength, mobility and flexibility tasks.    3. Manual therapy including soft tissue mobilizations, functional joint mobilizations and neuromuscular re-education to improve motion and reduce pain.  TREATMENT PLAN EFFECTIVE DATES: 3/8/17 TO 5/17/17  FREQUENCY/DURATION: Follow patient 1 times a week for 10 weeks to address above goals. SUBJECTIVE:    History of Present Injury/Illness (Reason for Referral): Bill Martin presents with difficulty walking, weakness and decreased balance. She also has had recent fall which resulted in right wrist fracture which required surgery to repair. She is well known to therapist having undergone previous therapy bout to help improve mobility and balance when her fall occurred. Her goals are to improve her leg strength and not fall any more. Aggravating factors: cooking, doing dishes, lifting Relieving factors: medicine, heat    Present Symptoms: Patient says she has been sore after the past few visits. Pain Intensity 1: 0  Dominant Side: right  Past Medical History: Ms. Vikash Myers  has a past medical history of Acute renal failure [facility]) (June, 2008); Arthritis; Asthma; CAD (coronary artery disease); COPD [facility]chronic obstructive [facility] disease) [facility] Deep vein thrombosis of lower leg [facility]) (June, 2008); Diabetes ([facility] Diabetes mellitus ([facility] Dislocation of right shoulder joint; Dyspnea on exertion (October, 2010); GERD (gastroesophageal reflux disease); High cholesterol; Hypertension; Hypothyroid; Morbid obesity ([address] Myocardial infarction ([address] (1991); Osteomyelitis of left foot (HCC) (???); Restless leg syndrome; and Sleep apnea. She also has no past medical history of Aneurysm ([facility] Arrhythmia; Autoimmune disease ([address] Cancer ([facility] Chronic kidney disease; Coagulation defects; DEMENTIA; Difficult intubation; Infectious disease; Liver disease; Malignant hyperthermia due to anesthesia; Nausea & vomiting; Neurological disorder; Pseudocholinesterase deficiency; Psychiatric disorder; PUD (peptic ulcer disease); Stroke [facility]); or Thyroid disease.   She also  has a past surgical history that includes tonsil and adenoidectomy (Childhood); coronary stent placement (1997 & 2010); hernia repair (2006); breast biopsy (2000); tubal ligation (1977); tonsillectomy; adenoidectomy; cardiac surg procedure unlist; knee replacement (May, 2008); orthopaedic; and orthopaedic. Current Medications:   Current Outpatient Prescriptions on File Prior to Encounter   Medication Sig Dispense Refill    magnesium oxide (MAG-OX) 400 mg tablet Take 400 mg by mouth daily.  insulin glargine (LANTUS) 100 unit/mL injection 50 Units by SubCUTAneous route every morning.  insulin glargine (LANTUS) 100 unit/mL injection 30 Units by SubCUTAneous route nightly.  insulin regular (NOVOLIN R, HUMULIN R) 100 unit/mL injection by SubCUTAneous route. Regular Insulin 8 units before breakfast and 8 units before lunch and 10 units at bedtime      oxybutynin chloride XL 10 mg CR tablet Take 10 mg by mouth daily. Take / use AM day of surgery with a sip of water per anesthesia protocols. Indications: \"BLADDER\"      amLODIPine (NORVASC) 5 mg tablet Take 5 mg by mouth daily. Take / use AM day of surgery with a sip of water per anesthesia protocols. Indications: HYPERTENSION      metoprolol-XL (TOPROL-XL) 100 mg XL tablet Take 100 mg by mouth daily. Take / use AM day of surgery with a sip of water per anesthesia protocols. Indications: HYPERTENSION      prasugrel (EFFIENT) 10 mg tablet Take 10 mg by mouth daily. Dr Eliz Suh and Dr Jason Louise aware that pt is continuing medication prior to surgery. Indications: THROMBOSIS PREVENTION AFTER PCI      aspirin (ASPIRIN) 325 mg tablet Take 325 mg by mouth every morning.  valsartan-hydrochlorothiazide (DIOVAN-HCT) 320-25 mg per tablet Take 1 Tab by mouth daily. Indications: HYPERTENSION      levothyroxine (SYNTHROID) 50 mcg tablet Take 50 mcg by mouth Daily (before breakfast). Take / use AM day of surgery with a sip of water per anesthesia protocols.    Indications: HYPOTHYROIDISM      budesonide-formoterol (SYMBICORT) 160-4.5 mcg/Actuation HFA inhaler Take 2 Puffs by inhalation two (2) times a day. Take / use AM day of surgery with a sip of water per anesthesia protocols.  atorvastatin (LIPITOR) 80 mg tablet Take 80 mg by mouth every evening. Indications: HYPERCHOLESTEROLEMIA      furosemide (LASIX) 20 mg tablet Take 20 mg by mouth daily. Indications: EDEMA      ezetimibe (ZETIA) 10 mg tablet Take 10 mg by mouth every evening. Indications: HYPERCHOLESTEROLEMIA      albuterol (PROVENTIL, VENTOLIN) 90 mcg/Actuation inhaler Take 2 Puffs by inhalation every four (4) hours as needed for Shortness of Breath.  fluoxetine (PROZAC) 20 mg Tab Take 20 mg by mouth daily. Take / use AM day of surgery with a sip of water per anesthesia protocols. No current facility-administered medications on file prior to encounter. Date Last Reviewed:4/26/2017  Social History/Home Situation:  Lives in a private home setting. No physical barriers present in home setting at this time. Work/Activity History: Retired  Quality of Life: Patient rates her quality of life as good. OBJECTIVE:    Outcome Measure: Tool Used: Lower Extremity Functional Scale (LEFS)  Score:  Initial: 14/80 (12/1/16) Most Recent: 20/80 (Date:3/8/17)   Interpretation of Score: 20 questions each scored on a 5 point scale with 0 representing \"extreme difficulty or unable to perform\" and 4 representing \"no difficulty\". The lower the score, the greater the functional disability. 80/80 represents no disability. Minimal detectable change is 9 points. Score 80 79-63 62-48 47-32 31-16 15-1 0   Modifier CH CI CJ CK CL CM CN     ?  Mobility - Walking and Moving Around:     - CURRENT STATUS: CL - 60%-79% impaired, limited or restricted    - GOAL STATUS:  CK - 40%-59% impaired, limited or restricted    - D/C STATUS:  ---------------To be determined---------------       Strength:       RIGHT LEFT    Knee extension  4+/5 4+/5    Knee flexion 4+/5 4+/5    Hip extension  4/5 4/5 Hip abduction 4/5 4/5    Hip flexion 4/5 4/5                      Functional Mobility:            Timed Up and Go 14.22 seconds with rollator     Stair (5 steps) 28.47 with min A from therapist to steady    Sit to stands (5 reps) 11.11 sec (21 in chair height)    Sit to stands (30 sec) 14 repetitions (21 in chair height)    Functional Activity Tolerance 2 minutes           Observation/Gait Assessment:  Patient ambulates with rollator with decreased step length, heavy UE reliance on AD and forward trunk lean. Observable fatigue noted with approximately 50 ft of continuous walking. Balance:     - Semi Tandem Stance: Right- 19.23 sec, Left- 13.37 sec   - Tandem stance: Unable   - Single leg Stance: Unable     Objective data as of: 3/8/17  TREATMENT:    (In addition to Assessment/Re-Assessment sessions the following treatments were rendered)  Therapeutic Exercise: (45 Minutes):  Exercises per grid below to improve mobility, strength and balance. Required moderate visual, verbal and manual cues to promote proper body mechanics. Progressed resistance, range, repetitions and complexity of movement as indicated.   (used abbreviations BET- back education training) Date:  4/12/17 Date:  4/19/17 Date:  4/26/17   Activity/Exercise Parameters Parameters Parameters   Patient Education Update HEP Review HEP Update HEP   LAQ 1 min x 4 1 min x 3 1 min x 4   Sit to stand  2 x 10 2 x 10 with arm curl #3 arm curl, 2 x 10   Kitchen sink balance -- -- --   Walking bouts 45 sec bouts x 5 45 sec bouts x 5 45 sec x 5   Stair taps -- -- --   Standing trials -- -- --   Bike 7 min 8 min 8 min   [place] -- -- Pronation/supination 2 x 10 each   Seated march, heel raise 1 min x 4 each  1 min x 4  1 min x 4 each       Manual Therapy (     ):   For improved ROM and mobility:    · To be provided next visit     (Used abbreviations: MET - muscle energy technique; PNF - proprioceptive neuromuscular facilitation; NMR - neuromuscular re-education; a/p - anterior to posterior; p/a - posterior to anterior, FMP - functional movement patterns)  Therapeutic Modalities: (for pain and inflammation)                                                                                              HEP: As above; handouts given to patient for all exercises. ______________________________________________________________________________________________________    Treatment Assessment:  Patient reports more muscle fatigue and soreness which limits her ability to perform all tasks with limited rest breaks today. Patient reports 0/10 pain at end of today's visit. Progression/Medical Necessity:   · Patient is expected to demonstrate progress in strength, range of motion and balance to increase independence with ADL tasks. Compliance with Program/Exercises: compliant most of the time. Reason for Continuation of Services/Other Comments:  · Patient continues to require present interventions due to patient's inability to cook, clean and walk without difficulty. Recommendations/Intent for next treatment session: Continue to challenge dynamic balance and strength.      Total Treatment Duration: 45 minutes   PT Patient Time In/Time Out  Time In: 1430  Time Out: 1215 [facility], PT, DPT

## 2017-05-17 ENCOUNTER — HOSPITAL ENCOUNTER (OUTPATIENT)
Dept: PHYSICAL THERAPY | Age: 77
Discharge: HOME | End: 2017-05-17
Payer: MEDICARE

## 2017-05-17 PROCEDURE — 97110 THERAPEUTIC EXERCISES: CPT

## 2017-05-18 NOTE — PROGRESS NOTES
Himanshu Resides   (TXT:[id number]) [id number]  at  [address], [address] [place] [address]  [phone]  [phone]                Outpatient PHYSICAL THERAPY: Daily Note and Recertification  Fall Risk Score: 7 (? 5 = High Risk)    ICD-10: Treatment Diagnosis: Difficulty walking, not elsewhere classified (R26.2), Muscle weakness, generalized (M62.81), Pain in right wrist (M25.531)    DATE: 5/17/2017  REFERRING PHYSICIAN:  Arianne Suarez MD MD Orders: evaluate and treat  Return Physician Appointment: TBD  MEDICAL/REFERRING DIAGNOSIS: LE weakness, right wrist pain  DATE OF ONSET: 9/27/16   PRIOR LEVEL OF FUNCTION: independent  PRECAUTIONS/ALLERGIES:   Allergies   Allergen Reactions    Coenzyme Q10 Rash    Niacin (Inositol Niacinate) Rash    Adhesive Tape-Silicones Rash     sts paper tape    Ambien [Zolpidem] Other (comments)     nightmares    Keflex [Cephalexin] Rash    Oxycodone Other (comments)     Slurred speech, hallucinations, droopy face, word finding issues    Pcn [Penicillins] Rash    Ramipril Other (comments)     nightmares       ASSESSMENT:  ?????? ? ? This section established at most recent assessment?????????? Patient is a 77 y.o. female who presents to physical therapy with difficulty walking, decreased balance and history of recent fall which resulted in right wrist injury. She also presents with weakness and decreased mobility which limits her ability to perform ADL without assistance and places patient at risk for falls in the future. Patient would benefit from skilled physical therapy to improve overall mobility and function. 3/8/17: Patient has made steady but slower than expected progress over the first few attended therapy visits. She has improved her walking tolerance and strength but still remains limited by decreased functional mobility and endurance which limits her ability to perform ADL tasks without assistance from others.  She will continue to benefit from skilled therapy to maximize her strength and mobility to reduce her risk of falls and return to highest level of functional independence possible. 5/17/17: Rita Rey has continued to make slower than anticipated progress but will continue to benefit from skilled therapy in order to improve her overall strength, mobility and balance to maximize her ADL function and reduce her risk of future falls. PROBLEM LIST (Impairments causing functional limitations):  1. Decreased Strength affecting function  2. Decreased ADL/Functional Activities  3. Decreased Flexibility/joint mobility  4. Increased Pain affecting function  5. Decreased Activity tolerance   GOALS: (Goals have been discussed and agreed upon with patient.)  SHORT-TERM FUNCTIONAL GOALS: Time Frame: 6 weeks  1. Patient demonstrates independence with home exercise program without verbal cueing provided by therapist. MET  2. Patient will perform 5 minutes or greater of standing functional tasks to improve ability to cook with less difficulty. ONGOING  DISCHARGE GOALS: Time Frame: 12 weeks  1. Patient will report LEFS score of 45/80 or greater to demonstrate improved LE function. ONGOING  2. Patient will decrease TUG score to less than 13 seconds to reduce risk of future falls. ONGOING  3. Patient will decrease 5 stair up/down to less than 15 seconds to improve ability to ambulate around her community. ONGOING  4. Patient will improve her dynamic balance by increasing her ability to maintain semi tandem stance for greater than 20 seconds to reduce her risk of future falls. ONGOING  REHABILITATION POTENTIAL FOR STATED GOALS: GoodPLAN OF CARE:INTERVENTIONS PLANNED: (Benefits and precautions of physical therapy have been discussed with the patient)  1.  Patient education including pathophysiology of falls and decreased mobility, back education/training (sleeping, sitting and standing positions, posture re-education and body mechanics) and instructions of home exercise program.   2. Therapeutic exercises including strength, mobility and flexibility tasks. 3. Manual therapy including soft tissue mobilizations, functional joint mobilizations and neuromuscular re-education to improve motion and reduce pain. TREATMENT PLAN EFFECTIVE DATES: 5/17/17 TO 7/26/17  FREQUENCY/DURATION: Follow patient 1 times a week for 10 weeks to address above goals. Regarding Anatoly Culver's therapy, I certify that the treatment plan above will be carried out by a therapist or under their direction. Thank you for this referral,   Ros Spence, PT, DPT   Sherie Carlos MD Signature: ______________________________________ Date:_____________                       SUBJECTIVE:    History of Present Injury/Illness (Reason for Referral): Bill Martin presents with difficulty walking, weakness and decreased balance. She also has had recent fall which resulted in right wrist fracture which required surgery to repair. She is well known to therapist having undergone previous therapy bout to help improve mobility and balance when her fall occurred. Her goals are to improve her leg strength and not fall any more. Aggravating factors: cooking, doing dishes, lifting Relieving factors: medicine, heat    Present Symptoms: Patient says she's been trying to walk around her house more the past week. Pain Intensity 1: 0  Dominant Side: right  Past Medical History: Ms. Vikash Myers  has a past medical history of Acute renal failure [facility]) (June, 2008); Arthritis; Asthma; CAD (coronary artery disease); COPD (chronic obstructive pulmonary disease) ([facility] [address] Deep vein thrombosis of lower leg [facility]) (June, 2008); Diabetes ([facility] [address] Diabetes mellitus ([address] Dislocation of right shoulder joint; Dyspnea on exertion (October, 2010); GERD (gastroesophageal reflux disease); High cholesterol; Hypertension; Hypothyroid; Morbid obesity ([facility] [address] Myocardial infarction ([address] (1991);  Osteomyelitis of left foot ([facility] [address] (???); Restless leg syndrome; and Sleep apnea. She also has no past medical history of Aneurysm ([facility] [address] Arrhythmia; Autoimmune disease ([facility] [address] Cancer ([facility] [address] Chronic kidney disease; Coagulation defects; DEMENTIA; Difficult intubation; Infectious disease; Liver disease; Malignant hyperthermia due to anesthesia; Nausea & vomiting; Neurological disorder; Pseudocholinesterase deficiency; Psychiatric disorder; PUD (peptic ulcer disease); Stroke [facility]); or Thyroid disease. She also  has a past surgical history that includes tonsil and adenoidectomy (Childhood); coronary stent placement (1997 & 2010); hernia repair (2006); breast biopsy (2000); tubal ligation (1977); tonsillectomy; adenoidectomy; cardiac surg procedure unlist; knee replacement (May, 2008); orthopaedic; and orthopaedic. Current Medications:   Current Outpatient Prescriptions on File Prior to Encounter   Medication Sig Dispense Refill    magnesium oxide (MAG-OX) 400 mg tablet Take 400 mg by mouth daily.  insulin glargine (LANTUS) 100 unit/mL injection 50 Units by SubCUTAneous route every morning.  insulin glargine (LANTUS) 100 unit/mL injection 30 Units by SubCUTAneous route nightly.  insulin regular (NOVOLIN R, HUMULIN R) 100 unit/mL injection by SubCUTAneous route. Regular Insulin 8 units before breakfast and 8 units before lunch and 10 units at bedtime      oxybutynin chloride XL 10 mg CR tablet Take 10 mg by mouth daily. Take / use AM day of surgery with a sip of water per anesthesia protocols. Indications: \"BLADDER\"      amLODIPine (NORVASC) 5 mg tablet Take 5 mg by mouth daily. Take / use AM day of surgery with a sip of water per anesthesia protocols. Indications: HYPERTENSION      metoprolol-XL (TOPROL-XL) 100 mg XL tablet Take 100 mg by mouth daily. Take / use AM day of surgery with a sip of water per anesthesia protocols. Indications: HYPERTENSION      prasugrel (EFFIENT) 10 mg tablet Take 10 mg by mouth daily.  Dr Veena Lyles and Dr oCn Varela aware that pt is continuing medication prior to surgery. Indications: THROMBOSIS PREVENTION AFTER PCI      aspirin (ASPIRIN) 325 mg tablet Take 325 mg by mouth every morning.  valsartan-hydrochlorothiazide (DIOVAN-HCT) 320-25 mg per tablet Take 1 Tab by mouth daily. Indications: HYPERTENSION      levothyroxine (SYNTHROID) 50 mcg tablet Take 50 mcg by mouth Daily (before breakfast). Take / use AM day of surgery with a sip of water per anesthesia protocols. Indications: HYPOTHYROIDISM      budesonide-formoterol (SYMBICORT) 160-4.5 mcg/Actuation HFA inhaler Take 2 Puffs by inhalation two (2) times a day. Take / use AM day of surgery with a sip of water per anesthesia protocols.  atorvastatin (LIPITOR) 80 mg tablet Take 80 mg by mouth every evening. Indications: HYPERCHOLESTEROLEMIA      furosemide (LASIX) 20 mg tablet Take 20 mg by mouth daily. Indications: EDEMA      ezetimibe (ZETIA) 10 mg tablet Take 10 mg by mouth every evening. Indications: HYPERCHOLESTEROLEMIA      albuterol (PROVENTIL, VENTOLIN) 90 mcg/Actuation inhaler Take 2 Puffs by inhalation every four (4) hours as needed for Shortness of Breath.  fluoxetine (PROZAC) 20 mg Tab Take 20 mg by mouth daily. Take / use AM day of surgery with a sip of water per anesthesia protocols. No current facility-administered medications on file prior to encounter. Date Last Reviewed:5/17/2017  Social History/Home Situation:  Lives in a private home setting. No physical barriers present in home setting at this time. Work/Activity History: Retired  Quality of Life: Patient rates her quality of life as good. OBJECTIVE:    Outcome Measure: Tool Used: Lower Extremity Functional Scale (LEFS)  Score:  Initial: 14/80 (12/1/16) Most Recent: 20/80 (Date:3/8/17)   Interpretation of Score: 20 questions each scored on a 5 point scale with 0 representing \"extreme difficulty or unable to perform\" and 4 representing \"no difficulty\". The lower the score, the greater the functional disability. 80/80 represents no disability. Minimal detectable change is 9 points. Score 80 79-63 62-48 47-32 31-16 15-1 0   Modifier CH CI CJ CK CL CM CN     ? Mobility - Walking and Moving Around:     - CURRENT STATUS: CL - 60%-79% impaired, limited or restricted    - GOAL STATUS:  CK - 40%-59% impaired, limited or restricted    - D/C STATUS:  ---------------To be determined---------------       Strength:       RIGHT LEFT    Knee extension  4+/5 4+/5    Knee flexion 4+/5 4+/5    Hip extension  4/5 4/5    Hip abduction 4/5 4/5    Hip flexion 4/5 4/5                      Functional Mobility:            Timed Up and Go 14.22 seconds with rollator     Stair (5 steps) 28.47 with min A from therapist to steady    Sit to stands (5 reps) 11.11 sec (21 in chair height)    Sit to stands (30 sec) 14 repetitions (21 in chair height)    Functional Activity Tolerance 2 minutes           Observation/Gait Assessment:  Patient ambulates with rollator with decreased step length, heavy UE reliance on AD and forward trunk lean. Observable fatigue noted with approximately 50 ft of continuous walking. Balance:     - Semi Tandem Stance: Right- 19.23 sec, Left- 13.37 sec   - Tandem stance: Unable   - Single leg Stance: Unable     Objective data as of: 3/8/17  TREATMENT:    (In addition to Assessment/Re-Assessment sessions the following treatments were rendered)  Therapeutic Exercise: (30 Minutes):  Exercises per grid below to improve mobility, strength and balance. Required moderate visual, verbal and manual cues to promote proper body mechanics. Progressed resistance, range, repetitions and complexity of movement as indicated.   (used abbreviations BET- back education training) Date:  4/19/17 Date:  4/26/17 Date:  5/17/17   Activity/Exercise Parameters Parameters Parameters   Patient Education Review HEP Update HEP Review HEP   LAQ 1 min x 3 1 min x 4 1 min x 2   Sit to stand  2 x 10 with arm curl #3 arm curl, 2 x 10 2 x 10   Kitchen sink balance -- -- --   Walking bouts 45 sec bouts x 5 45 sec x 5 1 min x 3   Stair taps -- -- --   Standing trials -- -- --   Bike 8 min 8 min --   [place] -- Pronation/supination 2 x 10 each --   Seated march, heel raise 1 min x 4  1 min x 4 each 1 min x 5 each       Manual Therapy (     ):   For improved ROM and mobility:    · To be provided next visit     (Used abbreviations: MET - muscle energy technique; PNF - proprioceptive neuromuscular facilitation; NMR - neuromuscular re-education; a/p - anterior to posterior; p/a - posterior to anterior, FMP - functional movement patterns)  Therapeutic Modalities: (for pain and inflammation)                                                                                              HEP: As above; handouts given to patient for all exercises. ______________________________________________________________________________________________________    Treatment Assessment:  Shortened session due to patient arriving 30 minutes late to appointment. She exhibits improved endurance with seated and standing tasks but still fatigues very quickly with prolonged walking bouts. Patient reports 0/10 pain at end of today's visit. Progression/Medical Necessity:   · Patient is expected to demonstrate progress in strength, range of motion and balance to increase independence with ADL tasks. Compliance with Program/Exercises: compliant most of the time. Reason for Continuation of Services/Other Comments:  · Patient continues to require present interventions due to patient's inability to cook, clean and walk without difficulty. Recommendations/Intent for next treatment session: Continue to challenge dynamic balance and strength.      Total Treatment Duration: 30 minutes   PT Patient Time In/Time Out  Time In: 1500  Time Out: 1530    Lele Polk, PT, DPT

## 2017-05-24 ENCOUNTER — HOSPITAL ENCOUNTER (OUTPATIENT)
Dept: PHYSICAL THERAPY | Age: 77
Discharge: HOME | End: 2017-05-24
Payer: MEDICARE

## 2017-05-24 NOTE — PROGRESS NOTES
[facility] at [address] [place] [address] [facility]  [phone]   [phone]    DATE: 5/24/2017    Patient cancelled appointment today due to inclement weather. Will plan to follow up on next scheduled visit.       Diego Pascal, PT, DPT

## 2017-05-31 ENCOUNTER — HOSPITAL ENCOUNTER (OUTPATIENT)
Dept: PHYSICAL THERAPY | Age: 77
Discharge: HOME | End: 2017-05-31
Payer: MEDICARE

## 2017-05-31 PROCEDURE — 97110 THERAPEUTIC EXERCISES: CPT

## 2017-05-31 NOTE — PROGRESS NOTES
Osmin Slider   (PYP:[birth year]) [address]  at  [address], [address] [place] [address]  [phone]  [phone]                Outpatient PHYSICAL THERAPY: Daily Note  Fall Risk Score: 7 (? 5 = High Risk)    ICD-10: Treatment Diagnosis: Difficulty walking, not elsewhere classified (R26.2), Muscle weakness, generalized (M62.81), Pain in right wrist (M25.531)    DATE: 5/31/2017  REFERRING PHYSICIAN:  Filemon Morrison MD MD Orders: evaluate and treat  Return Physician Appointment: TBD  MEDICAL/REFERRING DIAGNOSIS: LE weakness, right wrist pain  DATE OF ONSET: 9/27/16   PRIOR LEVEL OF FUNCTION: independent  PRECAUTIONS/ALLERGIES:   Allergies   Allergen Reactions    Coenzyme Q10 Rash    Niacin (Inositol Niacinate) Rash    Adhesive Tape-Silicones Rash     sts paper tape    Ambien [Zolpidem] Other (comments)     nightmares    Keflex [Cephalexin] Rash    Oxycodone Other (comments)     Slurred speech, hallucinations, droopy face, word finding issues    Pcn [Penicillins] Rash    Ramipril Other (comments)     nightmares       ASSESSMENT:  ?????? ? ? This section established at most recent assessment?????????? Patient is a 77 y.o. female who presents to physical therapy with difficulty walking, decreased balance and history of recent fall which resulted in right wrist injury. She also presents with weakness and decreased mobility which limits her ability to perform ADL without assistance and places patient at risk for falls in the future. Patient would benefit from skilled physical therapy to improve overall mobility and function. 3/8/17: Patient has made steady but slower than expected progress over the first few attended therapy visits. She has improved her walking tolerance and strength but still remains limited by decreased functional mobility and endurance which limits her ability to perform ADL tasks without assistance from others.  She will continue to benefit from skilled therapy to maximize her strength and mobility to reduce her risk of falls and return to highest level of functional independence possible. 5/17/17: Alicia Friend has continued to make slower than anticipated progress but will continue to benefit from skilled therapy in order to improve her overall strength, mobility and balance to maximize her ADL function and reduce her risk of future falls. PROBLEM LIST (Impairments causing functional limitations):  1. Decreased Strength affecting function  2. Decreased ADL/Functional Activities  3. Decreased Flexibility/joint mobility  4. Increased Pain affecting function  5. Decreased Activity tolerance   GOALS: (Goals have been discussed and agreed upon with patient.)  SHORT-TERM FUNCTIONAL GOALS: Time Frame: 6 weeks  1. Patient demonstrates independence with home exercise program without verbal cueing provided by therapist. MET  2. Patient will perform 5 minutes or greater of standing functional tasks to improve ability to cook with less difficulty. ONGOING  DISCHARGE GOALS: Time Frame: 12 weeks  1. Patient will report LEFS score of 45/80 or greater to demonstrate improved LE function. ONGOING  2. Patient will decrease TUG score to less than 13 seconds to reduce risk of future falls. ONGOING  3. Patient will decrease 5 stair up/down to less than 15 seconds to improve ability to ambulate around her community. ONGOING  4. Patient will improve her dynamic balance by increasing her ability to maintain semi tandem stance for greater than 20 seconds to reduce her risk of future falls. ONGOING  REHABILITATION POTENTIAL FOR STATED GOALS: GoodPLAN OF CARE:INTERVENTIONS PLANNED: (Benefits and precautions of physical therapy have been discussed with the patient)  1.  Patient education including pathophysiology of falls and decreased mobility, back education/training (sleeping, sitting and standing positions, posture re-education and body mechanics) and instructions of home exercise program.   2. Therapeutic exercises including strength, mobility and flexibility tasks. 3. Manual therapy including soft tissue mobilizations, functional joint mobilizations and neuromuscular re-education to improve motion and reduce pain. TREATMENT PLAN EFFECTIVE DATES: 5/17/17 TO 7/26/17  FREQUENCY/DURATION: Follow patient 1 times a week for 10 weeks to address above goals. SUBJECTIVE:    History of Present Injury/Illness (Reason for Referral): Alicia Friend presents with difficulty walking, weakness and decreased balance. She also has had recent fall which resulted in right wrist fracture which required surgery to repair. She is well known to therapist having undergone previous therapy bout to help improve mobility and balance when her fall occurred. Her goals are to improve her leg strength and not fall any more. Aggravating factors: cooking, doing dishes, lifting Relieving factors: medicine, heat    Present Symptoms: Patient says she has been sick and tired this week. Pain Intensity 1: 0  Dominant Side: right  Past Medical History: Ms. Regla Davis  has a past medical history of Acute renal failure [facility]) (June, 2008); Arthritis; Asthma; CAD (coronary artery disease); COPD (chronic obstructive pulmonary disease) ([address] Deep vein thrombosis of lower leg [facility]) (June, 2008); Diabetes ([address] Diabetes mellitus ([address] Dislocation of right shoulder joint; Dyspnea on exertion (October, 2010); GERD (gastroesophageal reflux disease); High cholesterol; Hypertension; Hypothyroid; Morbid obesity ([address] Myocardial infarction ([address] (1991); Osteomyelitis of left foot (HCC) (???); Restless leg syndrome; and Sleep apnea. She also has no past medical history of Aneurysm ([address] Arrhythmia; Autoimmune disease ([address] Cancer ([address] Chronic kidney disease; Coagulation defects; DEMENTIA; Difficult intubation; Infectious disease; Liver disease; Malignant hyperthermia due to anesthesia;  Nausea & vomiting; Neurological disorder; Pseudocholinesterase deficiency; Psychiatric disorder; PUD (peptic ulcer disease); Stroke [facility]); or Thyroid disease. She also  has a past surgical history that includes tonsil and adenoidectomy (Childhood); coronary stent placement (1997 & 2010); hernia repair (2006); breast biopsy (2000); tubal ligation (1977); tonsillectomy; adenoidectomy; cardiac surg procedure unlist; knee replacement (May, 2008); orthopaedic; and orthopaedic. Current Medications:   Current Outpatient Prescriptions on File Prior to Encounter   Medication Sig Dispense Refill    magnesium oxide (MAG-OX) 400 mg tablet Take 400 mg by mouth daily.  insulin glargine (LANTUS) 100 unit/mL injection 50 Units by SubCUTAneous route every morning.  insulin glargine (LANTUS) 100 unit/mL injection 30 Units by SubCUTAneous route nightly.  insulin regular (NOVOLIN R, HUMULIN R) 100 unit/mL injection by SubCUTAneous route. Regular Insulin 8 units before breakfast and 8 units before lunch and 10 units at bedtime      oxybutynin chloride XL 10 mg CR tablet Take 10 mg by mouth daily. Take / use AM day of surgery with a sip of water per anesthesia protocols. Indications: \"BLADDER\"      amLODIPine (NORVASC) 5 mg tablet Take 5 mg by mouth daily. Take / use AM day of surgery with a sip of water per anesthesia protocols. Indications: HYPERTENSION      metoprolol-XL (TOPROL-XL) 100 mg XL tablet Take 100 mg by mouth daily. Take / use AM day of surgery with a sip of water per anesthesia protocols. Indications: HYPERTENSION      prasugrel (EFFIENT) 10 mg tablet Take 10 mg by mouth daily. Dr Derrell Chatman and Dr Ric Wright aware that pt is continuing medication prior to surgery. Indications: THROMBOSIS PREVENTION AFTER PCI      aspirin (ASPIRIN) 325 mg tablet Take 325 mg by mouth every morning.  valsartan-hydrochlorothiazide (DIOVAN-HCT) 320-25 mg per tablet Take 1 Tab by mouth daily.  Indications: HYPERTENSION      levothyroxine (SYNTHROID) 50 mcg tablet Take 50 mcg by mouth Daily (before breakfast). Take / use AM day of surgery with a sip of water per anesthesia protocols. Indications: HYPOTHYROIDISM      budesonide-formoterol (SYMBICORT) 160-4.5 mcg/Actuation HFA inhaler Take 2 Puffs by inhalation two (2) times a day. Take / use AM day of surgery with a sip of water per anesthesia protocols.  atorvastatin (LIPITOR) 80 mg tablet Take 80 mg by mouth every evening. Indications: HYPERCHOLESTEROLEMIA      furosemide (LASIX) 20 mg tablet Take 20 mg by mouth daily. Indications: EDEMA      ezetimibe (ZETIA) 10 mg tablet Take 10 mg by mouth every evening. Indications: HYPERCHOLESTEROLEMIA      albuterol (PROVENTIL, VENTOLIN) 90 mcg/Actuation inhaler Take 2 Puffs by inhalation every four (4) hours as needed for Shortness of Breath.  fluoxetine (PROZAC) 20 mg Tab Take 20 mg by mouth daily. Take / use AM day of surgery with a sip of water per anesthesia protocols. No current facility-administered medications on file prior to encounter. Date Last Reviewed:5/31/2017  Social History/Home Situation:  Lives in a private home setting. No physical barriers present in home setting at this time. Work/Activity History: Retired  Quality of Life: Patient rates her quality of life as good. OBJECTIVE:    Outcome Measure: Tool Used: Lower Extremity Functional Scale (LEFS)  Score:  Initial: 14/80 (12/1/16) Most Recent: 20/80 (Date:3/8/17)   Interpretation of Score: 20 questions each scored on a 5 point scale with 0 representing \"extreme difficulty or unable to perform\" and 4 representing \"no difficulty\". The lower the score, the greater the functional disability. 80/80 represents no disability. Minimal detectable change is 9 points. Score 80 79-63 62-48 47-32 31-16 15-1 0   Modifier CH CI CJ CK CL CM CN     ?  Mobility - Walking and Moving Around:     - CURRENT STATUS: CL - 60%-79% impaired, limited or restricted    - GOAL STATUS:  CK - 40%-59% impaired, limited or restricted    - D/C STATUS:  ---------------To be determined---------------       Strength:       RIGHT LEFT    Knee extension  4+/5 4+/5    Knee flexion 4+/5 4+/5    Hip extension  4/5 4/5    Hip abduction 4/5 4/5    Hip flexion 4/5 4/5                      Functional Mobility:            Timed Up and Go 14.22 seconds with rollator     Stair (5 steps) 28.47 with min A from therapist to steady    Sit to stands (5 reps) 11.11 sec (21 in chair height)    Sit to stands (30 sec) 14 repetitions (21 in chair height)    Functional Activity Tolerance 2 minutes           Observation/Gait Assessment:  Patient ambulates with rollator with decreased step length, heavy UE reliance on AD and forward trunk lean. Observable fatigue noted with approximately 50 ft of continuous walking. Balance:     - Semi Tandem Stance: Right- 19.23 sec, Left- 13.37 sec   - Tandem stance: Unable   - Single leg Stance: Unable     Objective data as of: 3/8/17  TREATMENT:    (In addition to Assessment/Re-Assessment sessions the following treatments were rendered)  Therapeutic Exercise: (45 Minutes):  Exercises per grid below to improve mobility, strength and balance. Required moderate visual, verbal and manual cues to promote proper body mechanics. Progressed resistance, range, repetitions and complexity of movement as indicated.   (used abbreviations BET- back education training) Date:  4/26/17 Date:  5/17/17 Date:  5/31/17   Activity/Exercise Parameters Parameters Parameters   Patient Education Update HEP Review HEP Update HEP   LAQ 1 min x 4 1 min x 2 1 min x 3   Kitchen sink balance -- -- --   Walking bouts 45 sec x 5 1 min x 3 1 min x 3   Stair taps -- -- --   Standing trials -- -- --   Bike 8 min -- 10 min   [place] Pronation/supination 2 x 10 each -- --   Seated march, heel raise 1 min x 4 each 1 min x 5 each 1 min x 4 each       Manual Therapy (     ):   For improved ROM and mobility:    · To be provided next visit     (Used abbreviations: MET - muscle energy technique; PNF - proprioceptive neuromuscular facilitation; NMR - neuromuscular re-education; a/p - anterior to posterior; p/a - posterior to anterior, FMP - functional movement patterns)  Therapeutic Modalities: (for pain and inflammation)                                                                                              HEP: As above; handouts given to patient for all exercises. ______________________________________________________________________________________________________    Treatment Assessment:  Patient progress continues to be dictated by amount of HEP performance between therapy visits. Education provided on importance of HEP performance consistency and improving her overall strength and endurance with ADL tasks. Patient reports 0/10 pain at end of today's visit. Progression/Medical Necessity:   · Patient is expected to demonstrate progress in strength, range of motion and balance to increase independence with ADL tasks. Compliance with Program/Exercises: compliant most of the time. Reason for Continuation of Services/Other Comments:  · Patient continues to require present interventions due to patient's inability to cook, clean and walk without difficulty. Recommendations/Intent for next treatment session: Continue to challenge dynamic balance and strength.      Total Treatment Duration: 45 minutes   PT Patient Time In/Time Out  Time In: 1430  Time Out: 1215 [facility], PT, DPT

## 2017-06-07 ENCOUNTER — APPOINTMENT (OUTPATIENT)
Dept: PHYSICAL THERAPY | Age: 77
End: 2017-06-07
Payer: MEDICARE

## 2017-06-14 ENCOUNTER — APPOINTMENT (OUTPATIENT)
Dept: PHYSICAL THERAPY | Age: 77
End: 2017-06-14
Payer: MEDICARE

## 2017-06-15 ENCOUNTER — HOSPITAL ENCOUNTER (OUTPATIENT)
Dept: PHYSICAL THERAPY | Age: 77
Discharge: HOME | End: 2017-06-15
Payer: MEDICARE

## 2017-06-15 PROCEDURE — 97110 THERAPEUTIC EXERCISES: CPT

## 2017-06-15 NOTE — PROGRESS NOTES
Singh Wright   (CVL:[birth year]08[birth year]) [address]  at  [address], [address] [place] [address]  [phone]  [phone]                Outpatient PHYSICAL THERAPY: Daily Note  Fall Risk Score: 7 (? 5 = High Risk)    ICD-10: Treatment Diagnosis: Difficulty walking, not elsewhere classified (R26.2), Muscle weakness, generalized (M62.81), Pain in right wrist (M25.531)    DATE: 6/15/2017  REFERRING PHYSICIAN:  Shelley Reyes MD MD Orders: evaluate and treat  Return Physician Appointment: TBD  MEDICAL/REFERRING DIAGNOSIS: LE weakness, right wrist pain  DATE OF ONSET: 9/27/16   PRIOR LEVEL OF FUNCTION: independent  PRECAUTIONS/ALLERGIES:   Allergies   Allergen Reactions    Coenzyme Q10 Rash    Niacin (Inositol Niacinate) Rash    Adhesive Tape-Silicones Rash     sts paper tape    Ambien [Zolpidem] Other (comments)     nightmares    Keflex [Cephalexin] Rash    Oxycodone Other (comments)     Slurred speech, hallucinations, droopy face, word finding issues    Pcn [Penicillins] Rash    Ramipril Other (comments)     nightmares       ASSESSMENT:  ?????? ? ? This section established at most recent assessment?????????? Patient is a 77 y.o. female who presents to physical therapy with difficulty walking, decreased balance and history of recent fall which resulted in right wrist injury. She also presents with weakness and decreased mobility which limits her ability to perform ADL without assistance and places patient at risk for falls in the future. Patient would benefit from skilled physical therapy to improve overall mobility and function. 3/8/17: Patient has made steady but slower than expected progress over the first few attended therapy visits. She has improved her walking tolerance and strength but still remains limited by decreased functional mobility and endurance which limits her ability to perform ADL tasks without assistance from others.  She will continue to benefit from skilled therapy to maximize her strength and mobility to reduce her risk of falls and return to highest level of functional independence possible. 5/17/17: Liliam Bowling has continued to make slower than anticipated progress but will continue to benefit from skilled therapy in order to improve her overall strength, mobility and balance to maximize her ADL function and reduce her risk of future falls. PROBLEM LIST (Impairments causing functional limitations):  1. Decreased Strength affecting function  2. Decreased ADL/Functional Activities  3. Decreased Flexibility/joint mobility  4. Increased Pain affecting function  5. Decreased Activity tolerance   GOALS: (Goals have been discussed and agreed upon with patient.)  SHORT-TERM FUNCTIONAL GOALS: Time Frame: 6 weeks  1. Patient demonstrates independence with home exercise program without verbal cueing provided by therapist. MET  2. Patient will perform 5 minutes or greater of standing functional tasks to improve ability to cook with less difficulty. ONGOING  DISCHARGE GOALS: Time Frame: 12 weeks  1. Patient will report LEFS score of 45/80 or greater to demonstrate improved LE function. ONGOING  2. Patient will decrease TUG score to less than 13 seconds to reduce risk of future falls. ONGOING  3. Patient will decrease 5 stair up/down to less than 15 seconds to improve ability to ambulate around her community. ONGOING  4. Patient will improve her dynamic balance by increasing her ability to maintain semi tandem stance for greater than 20 seconds to reduce her risk of future falls. ONGOING  REHABILITATION POTENTIAL FOR STATED GOALS: GoodPLAN OF CARE:INTERVENTIONS PLANNED: (Benefits and precautions of physical therapy have been discussed with the patient)  1.  Patient education including pathophysiology of falls and decreased mobility, back education/training (sleeping, sitting and standing positions, posture re-education and body mechanics) and instructions of home exercise program.   2. Therapeutic exercises including strength, mobility and flexibility tasks. 3. Manual therapy including soft tissue mobilizations, functional joint mobilizations and neuromuscular re-education to improve motion and reduce pain. TREATMENT PLAN EFFECTIVE DATES: 5/17/17 TO 7/26/17  FREQUENCY/DURATION: Follow patient 1 times a week for 10 weeks to address above goals. SUBJECTIVE:    History of Present Injury/Illness (Reason for Referral): Eric Mahajan presents with difficulty walking, weakness and decreased balance. She also has had recent fall which resulted in right wrist fracture which required surgery to repair. She is well known to therapist having undergone previous therapy bout to help improve mobility and balance when her fall occurred. Her goals are to improve her leg strength and not fall any more. Aggravating factors: cooking, doing dishes, lifting Relieving factors: medicine, heat    Present Symptoms: Patient says she has felt better the past few days vs last week. Pain Intensity 1: 0  Dominant Side: right  Past Medical History: Ms. Minnie Tellez  has a past medical history of Acute renal failure [facility]) (June, 2008); Arthritis; Asthma; CAD (coronary artery disease); COPD (chronic obstructive pulmonary disease) ([address] Deep vein thrombosis of lower leg [facility]) (June, 2008); Diabetes ([address] Diabetes mellitus ([address] Dislocation of right shoulder joint; Dyspnea on exertion (October, 2010); GERD (gastroesophageal reflux disease); High cholesterol; Hypertension; Hypothyroid; Morbid obesity ([address] Myocardial infarction ([address] (1991); Osteomyelitis of left foot (HCC) (???); Restless leg syndrome; and Sleep apnea. She also has no past medical history of Aneurysm ([address] Arrhythmia; Autoimmune disease ([address] Cancer ([address] Chronic kidney disease; Coagulation defects; DEMENTIA; Difficult intubation; Infectious disease; Liver disease; Malignant hyperthermia due to anesthesia;  Nausea & vomiting; Neurological disorder; Pseudocholinesterase deficiency; Psychiatric disorder; PUD (peptic ulcer disease); Stroke [facility]); or Thyroid disease. She also  has a past surgical history that includes tonsil and adenoidectomy (Childhood); coronary stent placement (1997 & 2010); hernia repair (2006); breast biopsy (2000); tubal ligation (1977); tonsillectomy; adenoidectomy; cardiac surg procedure unlist; knee replacement (May, 2008); orthopaedic; and orthopaedic. Current Medications:   Current Outpatient Prescriptions on File Prior to Encounter   Medication Sig Dispense Refill    magnesium oxide (MAG-OX) 400 mg tablet Take 400 mg by mouth daily.  insulin glargine (LANTUS) 100 unit/mL injection 50 Units by SubCUTAneous route every morning.  insulin glargine (LANTUS) 100 unit/mL injection 30 Units by SubCUTAneous route nightly.  insulin regular (NOVOLIN R, HUMULIN R) 100 unit/mL injection by SubCUTAneous route. Regular Insulin 8 units before breakfast and 8 units before lunch and 10 units at bedtime      oxybutynin chloride XL 10 mg CR tablet Take 10 mg by mouth daily. Take / use AM day of surgery with a sip of water per anesthesia protocols. Indications: \"BLADDER\"      amLODIPine (NORVASC) 5 mg tablet Take 5 mg by mouth daily. Take / use AM day of surgery with a sip of water per anesthesia protocols. Indications: HYPERTENSION      metoprolol-XL (TOPROL-XL) 100 mg XL tablet Take 100 mg by mouth daily. Take / use AM day of surgery with a sip of water per anesthesia protocols. Indications: HYPERTENSION      prasugrel (EFFIENT) 10 mg tablet Take 10 mg by mouth daily. Dr Gayatri Herman and Dr Giancarlo Montiel aware that pt is continuing medication prior to surgery. Indications: THROMBOSIS PREVENTION AFTER PCI      aspirin (ASPIRIN) 325 mg tablet Take 325 mg by mouth every morning.  valsartan-hydrochlorothiazide (DIOVAN-HCT) 320-25 mg per tablet Take 1 Tab by mouth daily.  Indications: HYPERTENSION      levothyroxine (SYNTHROID) 50 mcg tablet Take 50 mcg by mouth Daily (before breakfast). Take / use AM day of surgery with a sip of water per anesthesia protocols. Indications: HYPOTHYROIDISM      budesonide-formoterol (SYMBICORT) 160-4.5 mcg/Actuation HFA inhaler Take 2 Puffs by inhalation two (2) times a day. Take / use AM day of surgery with a sip of water per anesthesia protocols.  atorvastatin (LIPITOR) 80 mg tablet Take 80 mg by mouth every evening. Indications: HYPERCHOLESTEROLEMIA      furosemide (LASIX) 20 mg tablet Take 20 mg by mouth daily. Indications: EDEMA      ezetimibe (ZETIA) 10 mg tablet Take 10 mg by mouth every evening. Indications: HYPERCHOLESTEROLEMIA      albuterol (PROVENTIL, VENTOLIN) 90 mcg/Actuation inhaler Take 2 Puffs by inhalation every four (4) hours as needed for Shortness of Breath.  fluoxetine (PROZAC) 20 mg Tab Take 20 mg by mouth daily. Take / use AM day of surgery with a sip of water per anesthesia protocols. No current facility-administered medications on file prior to encounter. Date Last Reviewed:6/15/2017  Social History/Home Situation:  Lives in a private home setting. No physical barriers present in home setting at this time. Work/Activity History: Retired  Quality of Life: Patient rates her quality of life as good. OBJECTIVE:    Outcome Measure: Tool Used: Lower Extremity Functional Scale (LEFS)  Score:  Initial: 14/80 (12/1/16) Most Recent: 20/80 (Date:3/8/17)   Interpretation of Score: 20 questions each scored on a 5 point scale with 0 representing \"extreme difficulty or unable to perform\" and 4 representing \"no difficulty\". The lower the score, the greater the functional disability. 80/80 represents no disability. Minimal detectable change is 9 points. Score 80 79-63 62-48 47-32 31-16 15-1 0   Modifier CH CI CJ CK CL CM CN     ?  Mobility - Walking and Moving Around:     - CURRENT STATUS: CL - 60%-79% impaired, limited or restricted    - GOAL STATUS:  CK - 40%-59% impaired, limited or restricted    - D/C STATUS:  ---------------To be determined---------------       Strength:       RIGHT LEFT    Knee extension  4+/5 4+/5    Knee flexion 4+/5 4+/5    Hip extension  4/5 4/5    Hip abduction 4/5 4/5    Hip flexion 4/5 4/5                      Functional Mobility:            Timed Up and Go 14.22 seconds with rollator     Stair (5 steps) 28.47 with min A from therapist to steady    Sit to stands (5 reps) 11.11 sec (21 in chair height)    Sit to stands (30 sec) 14 repetitions (21 in chair height)    Functional Activity Tolerance 2 minutes           Observation/Gait Assessment:  Patient ambulates with rollator with decreased step length, heavy UE reliance on AD and forward trunk lean. Observable fatigue noted with approximately 50 ft of continuous walking. Balance:     - Semi Tandem Stance: Right- 19.23 sec, Left- 13.37 sec   - Tandem stance: Unable   - Single leg Stance: Unable     Objective data as of: 3/8/17  TREATMENT:    (In addition to Assessment/Re-Assessment sessions the following treatments were rendered)  Therapeutic Exercise: (55 Minutes):  Exercises per grid below to improve mobility, strength and balance. Required moderate visual, verbal and manual cues to promote proper body mechanics. Progressed resistance, range, repetitions and complexity of movement as indicated.   (used abbreviations BET- back education training) Date:  5/17/17 Date:  5/31/17 Date:  6/15/17   Activity/Exercise Parameters Parameters Parameters   Patient Education Review HEP Update HEP Review HEP   LAQ 1 min x 2 1 min x 3 1 min x 3   Kitchen sink balance -- -- --   Walking bouts 1 min x 3 1 min x 3 1:45, 1:40, 1:30   Stair taps -- -- --   Standing trials -- -- --   Bike -- 10 min 8 min, RPM above 30   [place] -- -- --   Seated march, heel raise 1 min x 5 each 1 min x 4 each 1 min x 3 each   Sit to stand -- -- X10, x10 with #3 arm curl Manual Therapy (     ):   For improved ROM and mobility:    · To be provided next visit     (Used abbreviations: MET - muscle energy technique; PNF - proprioceptive neuromuscular facilitation; NMR - neuromuscular re-education; a/p - anterior to posterior; p/a - posterior to anterior, FMP - functional movement patterns)  Therapeutic Modalities: (for pain and inflammation)                                                                                              HEP: As above; handouts given to patient for all exercises. ______________________________________________________________________________________________________    Treatment Assessment:  Patient with slight improvements in ambulation distance and time before need for seated rest break but significant SOB noted at end of each bout. Patient reports 0/10 pain at end of today's visit. Progression/Medical Necessity:   · Patient is expected to demonstrate progress in strength, range of motion and balance to increase independence with ADL tasks. Compliance with Program/Exercises: compliant most of the time. Reason for Continuation of Services/Other Comments:  · Patient continues to require present interventions due to patient's inability to cook, clean and walk without difficulty. Recommendations/Intent for next treatment session: Continue to challenge dynamic balance and strength.      Total Treatment Duration: 55 minutes   PT Patient Time In/Time Out  Time In: 1430  Time Out: Dawna Wong 94, PT, DPT

## 2017-06-21 ENCOUNTER — HOSPITAL ENCOUNTER (OUTPATIENT)
Dept: PHYSICAL THERAPY | Age: 77
Discharge: HOME | End: 2017-06-21
Payer: MEDICARE

## 2017-06-21 NOTE — PROGRESS NOTES
Therapy Center at [address] [place] [address], Adore Erazo   [phone]   [phone]    DATE: 6/21/2017    Patient cancelled appointment today due to not feeling well. Will plan to follow up on next scheduled visit.       Felecia Rey, PT, DPT

## 2017-06-28 ENCOUNTER — HOSPITAL ENCOUNTER (OUTPATIENT)
Dept: PHYSICAL THERAPY | Age: 77
Discharge: HOME | End: 2017-06-28
Payer: MEDICARE

## 2017-06-28 PROCEDURE — 97110 THERAPEUTIC EXERCISES: CPT

## 2017-06-28 NOTE — PROGRESS NOTES
Aubrey Hendricksons   ([facility]:[birth year]/2591) [address]  at  [address]  [phone]  [phone]                Outpatient PHYSICAL THERAPY: Daily Note  Fall Risk Score: 7 (? 5 = High Risk)    ICD-10: Treatment Diagnosis: Difficulty walking, not elsewhere classified (R26.2), Muscle weakness, generalized (M62.81), Pain in right wrist (M25.531)    DATE: 6/28/2017  REFERRING PHYSICIAN:  Stephie Ramsay MD MD Orders: evaluate and treat  Return Physician Appointment: TBD  MEDICAL/REFERRING DIAGNOSIS: LE weakness, right wrist pain  DATE OF ONSET: 9/27/16   PRIOR LEVEL OF FUNCTION: independent  PRECAUTIONS/ALLERGIES:   Allergies   Allergen Reactions    Coenzyme Q10 Rash    Niacin (Inositol Niacinate) Rash    Adhesive Tape-Silicones Rash     sts paper tape    Ambien [Zolpidem] Other (comments)     nightmares    Keflex [Cephalexin] Rash    Oxycodone Other (comments)     Slurred speech, hallucinations, droopy face, word finding issues    Pcn [Penicillins] Rash    Ramipril Other (comments)     nightmares       ASSESSMENT:  ?????? ? ? This section established at most recent assessment?????????? Patient is a 77 y.o. female who presents to physical therapy with difficulty walking, decreased balance and history of recent fall which resulted in right wrist injury. She also presents with weakness and decreased mobility which limits her ability to perform ADL without assistance and places patient at risk for falls in the future. Patient would benefit from skilled physical therapy to improve overall mobility and function. 3/8/17: Patient has made steady but slower than expected progress over the first few attended therapy visits. She has improved her walking tolerance and strength but still remains limited by decreased functional mobility and endurance which limits her ability to perform ADL tasks without assistance from others.  She will continue to benefit from skilled therapy to maximize her strength and mobility to reduce her risk of falls and return to highest level of functional independence possible. 5/17/17: Mitzi Cruz has continued to make slower than anticipated progress but will continue to benefit from skilled therapy in order to improve her overall strength, mobility and balance to maximize her ADL function and reduce her risk of future falls. PROBLEM LIST (Impairments causing functional limitations):  1. Decreased Strength affecting function  2. Decreased ADL/Functional Activities  3. Decreased Flexibility/joint mobility  4. Increased Pain affecting function  5. Decreased Activity tolerance   GOALS: (Goals have been discussed and agreed upon with patient.)  SHORT-TERM FUNCTIONAL GOALS: Time Frame: 6 weeks  1. Patient demonstrates independence with home exercise program without verbal cueing provided by therapist. MET  2. Patient will perform 5 minutes or greater of standing functional tasks to improve ability to cook with less difficulty. ONGOING  DISCHARGE GOALS: Time Frame: 12 weeks  1. Patient will report LEFS score of 45/80 or greater to demonstrate improved LE function. ONGOING  2. Patient will decrease TUG score to less than 13 seconds to reduce risk of future falls. ONGOING  3. Patient will decrease 5 stair up/down to less than 15 seconds to improve ability to ambulate around her community. ONGOING  4. Patient will improve her dynamic balance by increasing her ability to maintain semi tandem stance for greater than 20 seconds to reduce her risk of future falls. ONGOING  REHABILITATION POTENTIAL FOR STATED GOALS: GoodPLAN OF CARE:INTERVENTIONS PLANNED: (Benefits and precautions of physical therapy have been discussed with the patient)  1.  Patient education including pathophysiology of falls and decreased mobility, back education/training (sleeping, sitting and standing positions, posture re-education and body mechanics) and instructions of home exercise program.   2. Therapeutic exercises including strength, mobility and flexibility tasks. 3. Manual therapy including soft tissue mobilizations, functional joint mobilizations and neuromuscular re-education to improve motion and reduce pain. TREATMENT PLAN EFFECTIVE DATES: 5/17/17 TO 7/26/17  FREQUENCY/DURATION: Follow patient 1 times a week for 10 weeks to address above goals. SUBJECTIVE:    History of Present Injury/Illness (Reason for Referral): Leidy Hernadez presents with difficulty walking, weakness and decreased balance. She also has had recent fall which resulted in right wrist fracture which required surgery to repair. She is well known to therapist having undergone previous therapy bout to help improve mobility and balance when her fall occurred. Her goals are to improve her leg strength and not fall any more. Aggravating factors: cooking, doing dishes, lifting Relieving factors: medicine, heat    Present Symptoms: Patient says she's been a little short of breath today. Pain Intensity 1: 0  Dominant Side: right  Past Medical History: Ms. Fabienne García  has a past medical history of Acute renal failure [facility]) (June, 2008); Arthritis; Asthma; CAD (coronary artery disease); COPD (chronic obstructive pulmonary disease) ([address] Deep vein thrombosis of lower leg [facility]) (June, 2008); Diabetes ([address] Diabetes mellitus ([address] Dislocation of right shoulder joint; Dyspnea on exertion (October, 2010); GERD (gastroesophageal reflux disease); High cholesterol; Hypertension; Hypothyroid; Morbid obesity ([address] Myocardial infarction ([address] (1991); Osteomyelitis of left foot (HCC) (???); Restless leg syndrome; and Sleep apnea. She also has no past medical history of Aneurysm ([address] Arrhythmia; Autoimmune disease ([address] Cancer ([address] Chronic kidney disease; Coagulation defects; DEMENTIA; Difficult intubation; Infectious disease; Liver disease; Malignant hyperthermia due to anesthesia;  Nausea & vomiting; Neurological disorder; Pseudocholinesterase deficiency; Psychiatric disorder; PUD (peptic ulcer disease); Stroke [facility]); or Thyroid disease. She also  has a past surgical history that includes tonsil and adenoidectomy (Childhood); coronary stent placement (1997 & 2010); hernia repair (2006); breast biopsy (2000); tubal ligation (1977); tonsillectomy; adenoidectomy; cardiac surg procedure unlist; knee replacement (May, 2008); orthopaedic; and orthopaedic. Current Medications:   Current Outpatient Prescriptions on File Prior to Encounter   Medication Sig Dispense Refill    magnesium oxide (MAG-OX) 400 mg tablet Take 400 mg by mouth daily.  insulin glargine (LANTUS) 100 unit/mL injection 50 Units by SubCUTAneous route every morning.  insulin glargine (LANTUS) 100 unit/mL injection 30 Units by SubCUTAneous route nightly.  insulin regular (NOVOLIN R, HUMULIN R) 100 unit/mL injection by SubCUTAneous route. Regular Insulin 8 units before breakfast and 8 units before lunch and 10 units at bedtime      oxybutynin chloride XL 10 mg CR tablet Take 10 mg by mouth daily. Take / use AM day of surgery with a sip of water per anesthesia protocols. Indications: \"BLADDER\"      amLODIPine (NORVASC) 5 mg tablet Take 5 mg by mouth daily. Take / use AM day of surgery with a sip of water per anesthesia protocols. Indications: HYPERTENSION      metoprolol-XL (TOPROL-XL) 100 mg XL tablet Take 100 mg by mouth daily. Take / use AM day of surgery with a sip of water per anesthesia protocols. Indications: HYPERTENSION      prasugrel (EFFIENT) 10 mg tablet Take 10 mg by mouth daily. Dr Xochilt Salinas and Dr Gabriella Mock aware that pt is continuing medication prior to surgery. Indications: THROMBOSIS PREVENTION AFTER PCI      aspirin (ASPIRIN) 325 mg tablet Take 325 mg by mouth every morning.  valsartan-hydrochlorothiazide (DIOVAN-HCT) 320-25 mg per tablet Take 1 Tab by mouth daily.  Indications: HYPERTENSION      levothyroxine (SYNTHROID) 50 mcg tablet Take 50 mcg by mouth Daily (before breakfast). Take / use AM day of surgery with a sip of water per anesthesia protocols. Indications: HYPOTHYROIDISM      budesonide-formoterol (SYMBICORT) 160-4.5 mcg/Actuation HFA inhaler Take 2 Puffs by inhalation two (2) times a day. Take / use AM day of surgery with a sip of water per anesthesia protocols.  atorvastatin (LIPITOR) 80 mg tablet Take 80 mg by mouth every evening. Indications: HYPERCHOLESTEROLEMIA      furosemide (LASIX) 20 mg tablet Take 20 mg by mouth daily. Indications: EDEMA      ezetimibe (ZETIA) 10 mg tablet Take 10 mg by mouth every evening. Indications: HYPERCHOLESTEROLEMIA      albuterol (PROVENTIL, VENTOLIN) 90 mcg/Actuation inhaler Take 2 Puffs by inhalation every four (4) hours as needed for Shortness of Breath.  fluoxetine (PROZAC) 20 mg Tab Take 20 mg by mouth daily. Take / use AM day of surgery with a sip of water per anesthesia protocols. No current facility-administered medications on file prior to encounter. Date Last Reviewed:6/28/2017  Social History/Home Situation:  Lives in a private home setting. No physical barriers present in home setting at this time. Work/Activity History: Retired  Quality of Life: Patient rates her quality of life as good. OBJECTIVE:    Outcome Measure: Tool Used: Lower Extremity Functional Scale (LEFS)  Score:  Initial: 14/80 (12/1/16) Most Recent: 20/80 (Date:3/8/17)   Interpretation of Score: 20 questions each scored on a 5 point scale with 0 representing \"extreme difficulty or unable to perform\" and 4 representing \"no difficulty\". The lower the score, the greater the functional disability. 80/80 represents no disability. Minimal detectable change is 9 points. Score 80 79-63 62-48 47-32 31-16 15-1 0   Modifier CH CI CJ CK CL CM CN     ?  Mobility - Walking and Moving Around:     - CURRENT STATUS: CL - 60%-79% impaired, limited or restricted    - GOAL STATUS:  CK - 40%-59% impaired, limited or restricted    - D/C STATUS:  ---------------To be determined---------------       Strength:       RIGHT LEFT    Knee extension  4+/5 4+/5    Knee flexion 4+/5 4+/5    Hip extension  4/5 4/5    Hip abduction 4/5 4/5    Hip flexion 4/5 4/5                      Functional Mobility:            Timed Up and Go 14.22 seconds with rollator     Stair (5 steps) 28.47 with min A from therapist to steady    Sit to stands (5 reps) 11.11 sec (21 in chair height)    Sit to stands (30 sec) 14 repetitions (21 in chair height)    Functional Activity Tolerance 2 minutes           Observation/Gait Assessment:  Patient ambulates with rollator with decreased step length, heavy UE reliance on AD and forward trunk lean. Observable fatigue noted with approximately 50 ft of continuous walking. Balance:     - Semi Tandem Stance: Right- 19.23 sec, Left- 13.37 sec   - Tandem stance: Unable   - Single leg Stance: Unable     Objective data as of: 3/8/17  TREATMENT:    (In addition to Assessment/Re-Assessment sessions the following treatments were rendered)  Therapeutic Exercise: (55 Minutes):  Exercises per grid below to improve mobility, strength and balance. Required moderate visual, verbal and manual cues to promote proper body mechanics. Progressed resistance, range, repetitions and complexity of movement as indicated.   (used abbreviations BET- back education training) Date:  5/31/17 Date:  6/15/17 Date:  6/28/17   Activity/Exercise Parameters Parameters Parameters   Patient Education Update HEP Review HEP Update HEP   LAQ 1 min x 3 1 min x 3 1 min x 3   Kitchen sink balance -- -- --   Walking bouts 1 min x 3 1:45, 1:40, 1:30 1:30 x 4   Stair taps -- -- --   Standing trials -- -- 1 min x 3   Bike 10 min 8 min, RPM above 30 8 min, RPM over 35   Seated march, heel raise 1 min x 4 each 1 min x 3 each 1 min x 4   Sit to stand -- X10, x10 with #3 arm curl  3 x 10 Manual Therapy (     ):   For improved ROM and mobility:    · To be provided next visit     (Used abbreviations: MET - muscle energy technique; PNF - proprioceptive neuromuscular facilitation; NMR - neuromuscular re-education; a/p - anterior to posterior; p/a - posterior to anterior, FMP - functional movement patterns)  Therapeutic Modalities: (for pain and inflammation)                                                                                              HEP: As above; handouts given to patient for all exercises. ______________________________________________________________________________________________________    Treatment Assessment:  Patient more short of breath at onset of session but improves with inhaler use. She continues to make slow but steady progress. Patient reports 0/10 pain at end of today's visit. Progression/Medical Necessity:   · Patient is expected to demonstrate progress in strength, range of motion and balance to increase independence with ADL tasks. Compliance with Program/Exercises: compliant most of the time. Reason for Continuation of Services/Other Comments:  · Patient continues to require present interventions due to patient's inability to cook, clean and walk without difficulty. Recommendations/Intent for next treatment session: Continue to challenge dynamic balance and strength.      Total Treatment Duration: 55 minutes   PT Patient Time In/Time Out  Time In: 1430  Time Out: Dawna Wong 89, PT, DPT

## 2017-07-06 ENCOUNTER — HOSPITAL ENCOUNTER (OUTPATIENT)
Dept: PHYSICAL THERAPY | Age: 77
Discharge: HOME | End: 2017-07-06
Payer: MEDICARE

## 2017-07-06 PROCEDURE — 97110 THERAPEUTIC EXERCISES: CPT

## 2017-07-06 NOTE — PROGRESS NOTES
Mary Jo Li   ([id number]) [address]  at  [address]  [phone]  [phone]                Outpatient PHYSICAL THERAPY: Daily Note  Fall Risk Score: 7 (? 5 = High Risk)    ICD-10: Treatment Diagnosis: Difficulty walking, not elsewhere classified (R26.2), Muscle weakness, generalized (M62.81), Pain in right wrist (M25.531)    DATE: 7/6/2017  REFERRING PHYSICIAN:  Yarely Martin MD MD Orders: evaluate and treat  Return Physician Appointment: TBD  MEDICAL/REFERRING DIAGNOSIS: LE weakness, right wrist pain  DATE OF ONSET: 9/27/16   PRIOR LEVEL OF FUNCTION: independent  PRECAUTIONS/ALLERGIES:   Allergies   Allergen Reactions    Coenzyme Q10 Rash    Niacin (Inositol Niacinate) Rash    Adhesive Tape-Silicones Rash     sts paper tape    Ambien [Zolpidem] Other (comments)     nightmares    Keflex [Cephalexin] Rash    Oxycodone Other (comments)     Slurred speech, hallucinations, droopy face, word finding issues    Pcn [Penicillins] Rash    Ramipril Other (comments)     nightmares       ASSESSMENT:  ?????? ? ? This section established at most recent assessment?????????? Patient is a 77 y.o. female who presents to physical therapy with difficulty walking, decreased balance and history of recent fall which resulted in right wrist injury. She also presents with weakness and decreased mobility which limits her ability to perform ADL without assistance and places patient at risk for falls in the future. Patient would benefit from skilled physical therapy to improve overall mobility and function. 3/8/17: Patient has made steady but slower than expected progress over the first few attended therapy visits. She has improved her walking tolerance and strength but still remains limited by decreased functional mobility and endurance which limits her ability to perform ADL tasks without assistance from others.  She will continue to benefit from skilled therapy to maximize her strength and mobility to reduce her risk of falls and return to highest level of functional independence possible. 5/17/17: Mitzi Cruz has continued to make slower than anticipated progress but will continue to benefit from skilled therapy in order to improve her overall strength, mobility and balance to maximize her ADL function and reduce her risk of future falls. PROBLEM LIST (Impairments causing functional limitations):  1. Decreased Strength affecting function  2. Decreased ADL/Functional Activities  3. Decreased Flexibility/joint mobility  4. Increased Pain affecting function  5. Decreased Activity tolerance   GOALS: (Goals have been discussed and agreed upon with patient.)  SHORT-TERM FUNCTIONAL GOALS: Time Frame: 6 weeks  1. Patient demonstrates independence with home exercise program without verbal cueing provided by therapist. MET  2. Patient will perform 5 minutes or greater of standing functional tasks to improve ability to cook with less difficulty. ONGOING  DISCHARGE GOALS: Time Frame: 12 weeks  1. Patient will report LEFS score of 45/80 or greater to demonstrate improved LE function. ONGOING  2. Patient will decrease TUG score to less than 13 seconds to reduce risk of future falls. ONGOING  3. Patient will decrease 5 stair up/down to less than 15 seconds to improve ability to ambulate around her community. ONGOING  4. Patient will improve her dynamic balance by increasing her ability to maintain semi tandem stance for greater than 20 seconds to reduce her risk of future falls. ONGOING  REHABILITATION POTENTIAL FOR STATED GOALS: GoodPLAN OF CARE:INTERVENTIONS PLANNED: (Benefits and precautions of physical therapy have been discussed with the patient)  1.  Patient education including pathophysiology of falls and decreased mobility, back education/training (sleeping, sitting and standing positions, posture re-education and body mechanics) and instructions of home exercise program.   2. Therapeutic exercises including strength, mobility and flexibility tasks. 3. Manual therapy including soft tissue mobilizations, functional joint mobilizations and neuromuscular re-education to improve motion and reduce pain. TREATMENT PLAN EFFECTIVE DATES: 5/17/17 TO 7/26/17  FREQUENCY/DURATION: Follow patient 1 times a week for 10 weeks to address above goals. SUBJECTIVE:    History of Present Injury/Illness (Reason for Referral): Adrian Payne presents with difficulty walking, weakness and decreased balance. She also has had recent fall which resulted in right wrist fracture which required surgery to repair. She is well known to therapist having undergone previous therapy bout to help improve mobility and balance when her fall occurred. Her goals are to improve her leg strength and not fall any more. Aggravating factors: cooking, doing dishes, lifting Relieving factors: medicine, heat    Present Symptoms: Patient says she was only able to do her exercises once since last visit. Pain Intensity 1: 0  Dominant Side: right  Past Medical History: Ms. Nino Stroud  has a past medical history of Acute renal failure [facility]) (June, 2008); Arthritis; Asthma; CAD (coronary artery disease); COPD (chronic obstructive pulmonary disease) ([address] Deep vein thrombosis of lower leg [facility]) (June, 2008); Diabetes ([address] Diabetes mellitus ([address] Dislocation of right shoulder joint; Dyspnea on exertion (October, 2010); GERD (gastroesophageal reflux disease); High cholesterol; Hypertension; Hypothyroid; Morbid obesity ([address] Myocardial infarction ([address] (1991); Osteomyelitis of left foot (HCC) (???); Restless leg syndrome; and Sleep apnea. She also has no past medical history of Aneurysm ([address] Arrhythmia; Autoimmune disease ([address] Cancer ([address] Chronic kidney disease; Coagulation defects; DEMENTIA; Difficult intubation;  Infectious disease; Liver disease; Malignant hyperthermia due to anesthesia; Nausea & vomiting; Neurological disorder; Pseudocholinesterase deficiency; Psychiatric disorder; PUD (peptic ulcer disease); Stroke [facility]); or Thyroid disease. She also  has a past surgical history that includes tonsil and adenoidectomy (Childhood); coronary stent placement (1997 & 2010); hernia repair (2006); breast biopsy (2000); tubal ligation (1977); tonsillectomy; adenoidectomy; cardiac surg procedure unlist; knee replacement (May, 2008); orthopaedic; and orthopaedic. Current Medications:   Current Outpatient Prescriptions on File Prior to Encounter   Medication Sig Dispense Refill    magnesium oxide (MAG-OX) 400 mg tablet Take 400 mg by mouth daily.  insulin glargine (LANTUS) 100 unit/mL injection 50 Units by SubCUTAneous route every morning.  insulin glargine (LANTUS) 100 unit/mL injection 30 Units by SubCUTAneous route nightly.  insulin regular (NOVOLIN R, HUMULIN R) 100 unit/mL injection by SubCUTAneous route. Regular Insulin 8 units before breakfast and 8 units before lunch and 10 units at bedtime      oxybutynin chloride XL 10 mg CR tablet Take 10 mg by mouth daily. Take / use AM day of surgery with a sip of water per anesthesia protocols. Indications: \"BLADDER\"      amLODIPine (NORVASC) 5 mg tablet Take 5 mg by mouth daily. Take / use AM day of surgery with a sip of water per anesthesia protocols. Indications: HYPERTENSION      metoprolol-XL (TOPROL-XL) 100 mg XL tablet Take 100 mg by mouth daily. Take / use AM day of surgery with a sip of water per anesthesia protocols. Indications: HYPERTENSION      prasugrel (EFFIENT) 10 mg tablet Take 10 mg by mouth daily. Dr Anatoly Renteria and Dr Ayleen Chang aware that pt is continuing medication prior to surgery. Indications: THROMBOSIS PREVENTION AFTER PCI      aspirin (ASPIRIN) 325 mg tablet Take 325 mg by mouth every morning.  valsartan-hydrochlorothiazide (DIOVAN-HCT) 320-25 mg per tablet Take 1 Tab by mouth daily.  Indications: HYPERTENSION  levothyroxine (SYNTHROID) 50 mcg tablet Take 50 mcg by mouth Daily (before breakfast). Take / use AM day of surgery with a sip of water per anesthesia protocols. Indications: HYPOTHYROIDISM      budesonide-formoterol (SYMBICORT) 160-4.5 mcg/Actuation HFA inhaler Take 2 Puffs by inhalation two (2) times a day. Take / use AM day of surgery with a sip of water per anesthesia protocols.  atorvastatin (LIPITOR) 80 mg tablet Take 80 mg by mouth every evening. Indications: HYPERCHOLESTEROLEMIA      furosemide (LASIX) 20 mg tablet Take 20 mg by mouth daily. Indications: EDEMA      ezetimibe (ZETIA) 10 mg tablet Take 10 mg by mouth every evening. Indications: HYPERCHOLESTEROLEMIA      albuterol (PROVENTIL, VENTOLIN) 90 mcg/Actuation inhaler Take 2 Puffs by inhalation every four (4) hours as needed for Shortness of Breath.  fluoxetine (PROZAC) 20 mg Tab Take 20 mg by mouth daily. Take / use AM day of surgery with a sip of water per anesthesia protocols. No current facility-administered medications on file prior to encounter. Date Last Reviewed:7/6/2017  Social History/Home Situation:  Lives in a private home setting. No physical barriers present in home setting at this time. Work/Activity History: Retired  Quality of Life: Patient rates her quality of life as good. OBJECTIVE:    Outcome Measure: Tool Used: Lower Extremity Functional Scale (LEFS)  Score:  Initial: 14/80 (12/1/16) Most Recent: 20/80 (Date:3/8/17)   Interpretation of Score: 20 questions each scored on a 5 point scale with 0 representing \"extreme difficulty or unable to perform\" and 4 representing \"no difficulty\". The lower the score, the greater the functional disability. 80/80 represents no disability. Minimal detectable change is 9 points. Score 80 79-63 62-48 47-32 31-16 15-1 0   Modifier CH CI CJ CK CL CM CN     ?  Mobility - Walking and Moving Around:     - CURRENT STATUS: CL - 60%-79% impaired, limited or restricted    - GOAL STATUS:  CK - 40%-59% impaired, limited or restricted    - D/C STATUS:  ---------------To be determined---------------       Strength:       RIGHT LEFT    Knee extension  4+/5 4+/5    Knee flexion 4+/5 4+/5    Hip extension  4/5 4/5    Hip abduction 4/5 4/5    Hip flexion 4/5 4/5                      Functional Mobility:            Timed Up and Go 14.22 seconds with rollator     Stair (5 steps) 28.47 with min A from therapist to steady    Sit to stands (5 reps) 11.11 sec (21 in chair height)    Sit to stands (30 sec) 14 repetitions (21 in chair height)    Functional Activity Tolerance 2 minutes           Observation/Gait Assessment:  Patient ambulates with rollator with decreased step length, heavy UE reliance on AD and forward trunk lean. Observable fatigue noted with approximately 50 ft of continuous walking. Balance:     - Semi Tandem Stance: Right- 19.23 sec, Left- 13.37 sec   - Tandem stance: Unable   - Single leg Stance: Unable     Objective data as of: 3/8/17  TREATMENT:    (In addition to Assessment/Re-Assessment sessions the following treatments were rendered)  Therapeutic Exercise: (55 Minutes):  Exercises per grid below to improve mobility, strength and balance. Required moderate visual, verbal and manual cues to promote proper body mechanics. Progressed resistance, range, repetitions and complexity of movement as indicated.   (used abbreviations BET- back education training) Date:  6/15/17 Date:  6/28/17 Date:  7/6/17   Activity/Exercise Parameters Parameters Parameters   Patient Education Review HEP Update HEP Review HEP   LAQ 1 min x 3 1 min x 3 1 min x 3   Kitchen sink balance -- -- --   Walking bouts 1:45, 1:40, 1:30 1:30 x 4 1 min x 5   Stair taps -- -- --   Standing trials -- 1 min x 3 --   Bike 8 min, RPM above 30 8 min, RPM over 35 5 min, RPM over 40, level 1.5   Seated march, heel raise 1 min x 3 each 1 min x 4 1 min x 3   Sit to stand X10, x10 with #3 arm curl  3 x 10 2 x 15       Manual Therapy (     ):   For improved ROM and mobility:    · To be provided next visit     (Used abbreviations: MET - muscle energy technique; PNF - proprioceptive neuromuscular facilitation; NMR - neuromuscular re-education; a/p - anterior to posterior; p/a - posterior to anterior, FMP - functional movement patterns)  Therapeutic Modalities: (for pain and inflammation)                                                                                              HEP: As above; handouts given to patient for all exercises. ______________________________________________________________________________________________________    Treatment Assessment:  Patient able to perform more walking bouts at 1 minute each but remains short of breath after any activities 1 minute or longer in duration. Patient reports 0/10 pain at end of today's visit. Progression/Medical Necessity:   · Patient is expected to demonstrate progress in strength, range of motion and balance to increase independence with ADL tasks. Compliance with Program/Exercises: compliant most of the time. Reason for Continuation of Services/Other Comments:  · Patient continues to require present interventions due to patient's inability to cook, clean and walk without difficulty. Recommendations/Intent for next treatment session: Continue to challenge dynamic balance and strength.      Total Treatment Duration: 55 minutes   PT Patient Time In/Time Out  Time In: 1530  Time Out: Juanjose Martinez 20, PT, DPT

## 2017-07-20 ENCOUNTER — HOSPITAL ENCOUNTER (OUTPATIENT)
Dept: PHYSICAL THERAPY | Age: 77
Discharge: HOME | End: 2017-07-20
Payer: MEDICARE

## 2017-07-20 NOTE — PROGRESS NOTES
[facility]rapy Center at [address] [place] [address] Adore Erazo   [phone]   [phone]    DATE: 7/20/2017    Patient cancelled appointment today due to not feeling well. Will plan to follow up on next scheduled visit.       Jeffrey Gan, PT, DPT

## 2017-07-26 ENCOUNTER — HOSPITAL ENCOUNTER (OUTPATIENT)
Dept: PHYSICAL THERAPY | Age: 77
Discharge: HOME | End: 2017-07-26
Payer: MEDICARE

## 2017-07-26 NOTE — PROGRESS NOTES
[facility]rapy Center at [address] [place] [address], Adore Erazo   [phone]   [phone]    DATE: 7/26/2017    Patient cancelled appointment today due to not feeling well. Will plan to follow up on next scheduled visit.       Lauren Gomez, PT, DPT

## 2017-08-16 NOTE — PROGRESS NOTES
Gerard Arriaza   ([id number]:[id number]/79/[id number]) [address] [place] [address]  at  [address]  [phone]  [phone]                Outpatient PHYSICAL THERAPY: Discontinuation Summary  Fall Risk Score: 7 (? 5 = High Risk)    ICD-10: Treatment Diagnosis: Difficulty walking, not elsewhere classified (R26.2), Muscle weakness, generalized (M62.81), Pain in right wrist (M25.531)    DATE: 8/16/2017  REFERRING PHYSICIAN:  Rebecca Xiong MD MD Orders: evaluate and treat  Return Physician Appointment: TBD  MEDICAL/REFERRING DIAGNOSIS: LE weakness, right wrist pain  DATE OF ONSET: 9/27/16   PRIOR LEVEL OF FUNCTION: independent  PRECAUTIONS/ALLERGIES:   Allergies   Allergen Reactions    Coenzyme Q10 Rash    Niacin (Inositol Niacinate) Rash    Adhesive Tape-Silicones Rash     sts paper tape    Ambien [Zolpidem] Other (comments)     nightmares    Keflex [Cephalexin] Rash    Oxycodone Other (comments)     Slurred speech, hallucinations, droopy face, word finding issues    Pcn [Penicillins] Rash    Ramipril Other (comments)     nightmares       ASSESSMENT:  ?????? ? ? This section established at most recent assessment?????????? Patient is a 69 y.o. female who presents to physical therapy with difficulty walking, decreased balance and history of recent fall which resulted in right wrist injury. She also presents with weakness and decreased mobility which limits her ability to perform ADL without assistance and places patient at risk for falls in the future. Patient would benefit from skilled physical therapy to improve overall mobility and function. 3/8/17: Patient has made steady but slower than expected progress over the first few attended therapy visits. She has improved her walking tolerance and strength but still remains limited by decreased functional mobility and endurance which limits her ability to perform ADL tasks without assistance from others.  She will continue to benefit from skilled therapy to maximize her strength and mobility to reduce her risk of falls and return to highest level of functional independence possible. 5/17/17: Giovanni Julio has continued to make slower than anticipated progress but will continue to benefit from skilled therapy in order to improve her overall strength, mobility and balance to maximize her ADL function and reduce her risk of future falls. 8/16/17: Patient self discharging from therapy after being unable to consistently attend therapy visits. As result, subjective and objective information unable to assessed and updated at time of discontinuation. PROBLEM LIST (Impairments causing functional limitations):  1. Decreased Strength affecting function  2. Decreased ADL/Functional Activities  3. Decreased Flexibility/joint mobility  4. Increased Pain affecting function  5. Decreased Activity tolerance   GOALS: (Goals have been discussed and agreed upon with patient.)  SHORT-TERM FUNCTIONAL GOALS: Time Frame: 6 weeks  1. Patient demonstrates independence with home exercise program without verbal cueing provided by therapist. MET  2. Patient will perform 5 minutes or greater of standing functional tasks to improve ability to cook with less difficulty. ONGOING  DISCHARGE GOALS: Time Frame: 12 weeks  1. Patient will report LEFS score of 45/80 or greater to demonstrate improved LE function. MADE PROGRESS, NOT MET  2. Patient will decrease TUG score to less than 13 seconds to reduce risk of future falls. MADE PROGRESS, NOT MET  3. Patient will decrease 5 stair up/down to less than 15 seconds to improve ability to ambulate around her community. MADE PROGRESS, NOT MET  4. Patient will improve her dynamic balance by increasing her ability to maintain semi tandem stance for greater than 20 seconds to reduce her risk of future falls.  MADE PROGRESS, NOT MET  REHABILITATION POTENTIAL FOR STATED GOALS: Good                   SUBJECTIVE:    History of Present Injury/Illness (Reason for Referral): Zane Gonzalez presents with difficulty walking, weakness and decreased balance. She also has had recent fall which resulted in right wrist fracture which required surgery to repair. She is well known to therapist having undergone previous therapy bout to help improve mobility and balance when her fall occurred. Her goals are to improve her leg strength and not fall any more. Aggravating factors: cooking, doing dishes, lifting Relieving factors: medicine, heat    Dominant Side: right  Past Medical History: Ms. Finn Grant  has a past medical history of Acute renal failure [facility]) (June, 2008); Arthritis; Asthma; CAD (coronary artery disease); COPD (chronic obstructive pulmonary disease) ([facility] Deep vein thrombosis of lower leg [facility]) (June, 2008); Diabetes [address] Diabetes mellitus ([facility] Dislocation of right shoulder joint; Dyspnea on exertion (October, 2010); GERD (gastroesophageal reflux disease); High cholesterol; Hypertension; Hypothyroid; Morbid obesity ([facility] Myocardial infarction ([facility] (1991); Osteomyelitis of left foot (HCC) (???); Restless leg syndrome; and Sleep apnea. She also has no past medical history of Aneurysm ([facility] Arrhythmia; Autoimmune disease ([facility] Cancer ([facility] Chronic kidney disease; Coagulation defects; DEMENTIA; Difficult intubation; Infectious disease; Liver disease; Malignant hyperthermia due to anesthesia; Nausea & vomiting; Neurological disorder; Pseudocholinesterase deficiency; Psychiatric disorder; PUD (peptic ulcer disease); Stroke [facility]); or Thyroid disease. She also  has a past surgical history that includes tonsil and adenoidectomy (Childhood); coronary stent placement (1997 & 2010); hernia repair (2006); breast biopsy (2000); tubal ligation (1977); tonsillectomy; adenoidectomy; cardiac surg procedure unlist; knee replacement (May, 2008); orthopaedic; and orthopaedic.    Current Medications:   Current Outpatient Prescriptions on File Prior to Encounter Medication Sig Dispense Refill    magnesium oxide (MAG-OX) 400 mg tablet Take 400 mg by mouth daily.  insulin glargine (LANTUS) 100 unit/mL injection 50 Units by SubCUTAneous route every morning.  insulin glargine (LANTUS) 100 unit/mL injection 30 Units by SubCUTAneous route nightly.  insulin regular (NOVOLIN R, HUMULIN R) 100 unit/mL injection by SubCUTAneous route. Regular Insulin 8 units before breakfast and 8 units before lunch and 10 units at bedtime      amLODIPine (NORVASC) 5 mg tablet Take 5 mg by mouth daily. Take / use AM day of surgery with a sip of water per anesthesia protocols. Indications: HYPERTENSION      metoprolol-XL (TOPROL-XL) 100 mg XL tablet Take 100 mg by mouth daily. Take / use AM day of surgery with a sip of water per anesthesia protocols. Indications: HYPERTENSION      valsartan-hydrochlorothiazide (DIOVAN-HCT) 320-25 mg per tablet Take 1 Tab by mouth daily. Indications: HYPERTENSION      levothyroxine (SYNTHROID) 50 mcg tablet Take 75 mcg by mouth Daily (before breakfast). Take / use AM day of surgery with a sip of water per anesthesia protocols. Indications: hypothyroidism      budesonide-formoterol (SYMBICORT) 160-4.5 mcg/Actuation HFA inhaler Take 2 Puffs by inhalation two (2) times a day. Take / use AM day of surgery with a sip of water per anesthesia protocols.  atorvastatin (LIPITOR) 80 mg tablet Take 80 mg by mouth every evening. Indications: HYPERCHOLESTEROLEMIA      albuterol (PROVENTIL, VENTOLIN) 90 mcg/Actuation inhaler Take 2 Puffs by inhalation every four (4) hours as needed for Shortness of Breath.  fluoxetine (PROZAC) 20 mg Tab Take 20 mg by mouth daily. Take / use AM day of surgery with a sip of water per anesthesia protocols. No current facility-administered medications on file prior to encounter. Date Last Reviewed:8/16/2017  Social History/Home Situation:  Lives in a private home setting.  No physical barriers present in home setting at this time. Work/Activity History: Retired  Quality of Life: Patient rates her quality of life as good. OBJECTIVE:    Outcome Measure: Tool Used: Lower Extremity Functional Scale (LEFS)  Score:  Initial: 14/80 (12/1/16) Most Recent: 20/80 (Date:3/8/17)   Interpretation of Score: 20 questions each scored on a 5 point scale with 0 representing \"extreme difficulty or unable to perform\" and 4 representing \"no difficulty\". The lower the score, the greater the functional disability. 80/80 represents no disability. Minimal detectable change is 9 points. Score 80 79-63 62-48 47-32 31-16 15-1 0   Modifier CH CI CJ CK CL CM CN     ? Mobility - Walking and Moving Around:     - CURRENT STATUS: CL - 60%-79% impaired, limited or restricted    - GOAL STATUS:  CK - 40%-59% impaired, limited or restricted    - D/C STATUS:  ---------------To be determined---------------       Strength:       RIGHT LEFT    Knee extension  4+/5 4+/5    Knee flexion 4+/5 4+/5    Hip extension  4/5 4/5    Hip abduction 4/5 4/5    Hip flexion 4/5 4/5                      Functional Mobility:            Timed Up and Go 14.22 seconds with rollator     Stair (5 steps) 28.47 with min A from therapist to steady    Sit to stands (5 reps) 11.11 sec (21 in chair height)    Sit to stands (30 sec) 14 repetitions (21 in chair height)    Functional Activity Tolerance 2 minutes           Observation/Gait Assessment:  Patient ambulates with rollator with decreased step length, heavy UE reliance on AD and forward trunk lean. Observable fatigue noted with approximately 50 ft of continuous walking. Balance:     - Semi Tandem Stance: Right- 19.23 sec, Left- 13.37 sec   - Tandem stance: Unable   - Single leg Stance: Unable     Objective data as of: 3/8/17    Recommendations: Discharge from physical therapy.    Lauren Gomez, PT, DPT

## 2019-01-29 ENCOUNTER — APPOINTMENT (OUTPATIENT)
Dept: GENERAL RADIOLOGY | Age: 79
End: 2019-01-29
Attending: EMERGENCY MEDICINE
Payer: MEDICARE

## 2019-01-29 ENCOUNTER — HOSPITAL ENCOUNTER (EMERGENCY)
Age: 79
Discharge: HOME | End: 2019-01-29
Attending: EMERGENCY MEDICINE
Payer: MEDICARE

## 2019-01-29 VITALS
SYSTOLIC BLOOD PRESSURE: 153 MMHG | DIASTOLIC BLOOD PRESSURE: 71 MMHG | TEMPERATURE: 98.1 F | RESPIRATION RATE: 20 BRPM | OXYGEN SATURATION: 95 % | HEART RATE: 55 BPM

## 2019-01-29 DIAGNOSIS — N30.00 ACUTE CYSTITIS WITHOUT HEMATURIA: ICD-10-CM

## 2019-01-29 DIAGNOSIS — R53.83 FATIGUE, UNSPECIFIED TYPE: Primary | ICD-10-CM

## 2019-01-29 LAB
ALBUMIN SERPL-MCNC: 3.7 G/DL (ref 3.2–4.6)
ALBUMIN/GLOB SERPL: 0.9 {RATIO}
ALP SERPL-CCNC: 89 U/L (ref 50–130)
ALT SERPL-CCNC: 52 U/L (ref 12–65)
ANION GAP SERPL CALC-SCNC: 12 MMOL/L
AST SERPL-CCNC: 139 U/L (ref 15–37)
BACTERIA URNS QL MICRO: NORMAL /HPF
BASOPHILS # BLD: 0 K/UL (ref 0–0.2)
BASOPHILS NFR BLD: 0 % (ref 0–2)
BILIRUB SERPL-MCNC: 0.6 MG/DL (ref 0.2–1.1)
BUN SERPL-MCNC: 49 MG/DL (ref 8–23)
CALCIUM SERPL-MCNC: 10.1 MG/DL (ref 8.3–10.4)
CASTS URNS QL MICRO: NORMAL /LPF
CHLORIDE SERPL-SCNC: 104 MMOL/L (ref 98–107)
CO2 SERPL-SCNC: 24 MMOL/L (ref 21–32)
CREAT SERPL-MCNC: 1.71 MG/DL (ref 0.6–1)
CRYSTALS URNS QL MICRO: 0 /LPF
DIFFERENTIAL METHOD BLD: ABNORMAL
EOSINOPHIL # BLD: 0 K/UL (ref 0–0.8)
EOSINOPHIL NFR BLD: 0 % (ref 0.5–7.8)
EPI CELLS #/AREA URNS HPF: NORMAL /HPF
ERYTHROCYTE [DISTWIDTH] IN BLOOD BY AUTOMATED COUNT: 13.6 % (ref 11.9–14.6)
GLOBULIN SER CALC-MCNC: 3.9 G/DL (ref 2.3–3.5)
GLUCOSE SERPL-MCNC: 130 MG/DL (ref 65–100)
HCT VFR BLD AUTO: 46.2 % (ref 35.8–46.3)
HGB BLD-MCNC: 15 G/DL (ref 11.7–15.4)
IMM GRANULOCYTES # BLD AUTO: 0 K/UL (ref 0–0.5)
IMM GRANULOCYTES NFR BLD AUTO: 0 % (ref 0–5)
LYMPHOCYTES # BLD: 2.8 K/UL (ref 0.5–4.6)
LYMPHOCYTES NFR BLD: 22 % (ref 13–44)
MCH RBC QN AUTO: 29.8 PG (ref 26.1–32.9)
MCHC RBC AUTO-ENTMCNC: 32.5 G/DL (ref 31.4–35)
MCV RBC AUTO: 91.7 FL (ref 79.6–97.8)
MONOCYTES # BLD: 1.2 K/UL (ref 0.1–1.3)
MONOCYTES NFR BLD: 10 % (ref 4–12)
MUCOUS THREADS URNS QL MICRO: 0 /LPF
NEUTS SEG # BLD: 8.6 K/UL (ref 1.7–8.2)
NEUTS SEG NFR BLD: 68 % (ref 43–78)
NRBC # BLD: 0 K/UL (ref 0–0.2)
OTHER OBSERVATIONS,UCOM: NORMAL
PLATELET # BLD AUTO: 238 K/UL (ref 150–450)
PMV BLD AUTO: 12.6 FL (ref 9.4–12.3)
POTASSIUM SERPL-SCNC: 4.5 MMOL/L (ref 3.5–5.1)
PROT SERPL-MCNC: 7.6 G/DL
RBC # BLD AUTO: 5.04 M/UL (ref 4.05–5.2)
RBC #/AREA URNS HPF: 0 /HPF
SODIUM SERPL-SCNC: 140 MMOL/L (ref 136–145)
WBC # BLD AUTO: 12.7 K/UL (ref 4.3–11.1)
WBC URNS QL MICRO: NORMAL /HPF

## 2019-01-29 PROCEDURE — 85025 COMPLETE CBC W/AUTO DIFF WBC: CPT

## 2019-01-29 PROCEDURE — 51701 INSERT BLADDER CATHETER: CPT | Performed by: EMERGENCY MEDICINE

## 2019-01-29 PROCEDURE — 99285 EMERGENCY DEPT VISIT HI MDM: CPT | Performed by: EMERGENCY MEDICINE

## 2019-01-29 PROCEDURE — 81015 MICROSCOPIC EXAM OF URINE: CPT

## 2019-01-29 PROCEDURE — 74011250636 HC RX REV CODE- 250/636: Performed by: EMERGENCY MEDICINE

## 2019-01-29 PROCEDURE — 77030011943

## 2019-01-29 PROCEDURE — 96360 HYDRATION IV INFUSION INIT: CPT | Performed by: EMERGENCY MEDICINE

## 2019-01-29 PROCEDURE — 80053 COMPREHEN METABOLIC PANEL: CPT

## 2019-01-29 PROCEDURE — 96361 HYDRATE IV INFUSION ADD-ON: CPT | Performed by: EMERGENCY MEDICINE

## 2019-01-29 PROCEDURE — 81003 URINALYSIS AUTO W/O SCOPE: CPT | Performed by: EMERGENCY MEDICINE

## 2019-01-29 PROCEDURE — 71045 X-RAY EXAM CHEST 1 VIEW: CPT

## 2019-01-29 RX ORDER — NITROFURANTOIN 25; 75 MG/1; MG/1
100 CAPSULE ORAL 2 TIMES DAILY
Qty: 14 CAP | Refills: 0 | Status: SHIPPED | OUTPATIENT
Start: 2019-01-29 | End: 2019-02-05

## 2019-01-29 RX ADMIN — SODIUM CHLORIDE 1000 ML: 900 INJECTION, SOLUTION INTRAVENOUS at 08:20

## 2019-01-29 NOTE — PROGRESS NOTES
Visited with patient. Received awake and alert ×4 resting in bed but very sleepy. States she lives at home with her daughter Veronica Aguirre. States at baseline she is independent in bathing and dressing and still drives. States she uses a walker for ambulation assistance. Called her daughter Veronica Aguirre with her permission as Dr. Analilia Modi states she is up for discharge. Daughter states patient is independent in bathing but she has to help her in and out of the shower. States she is able to get up out of the bed and go to the kitchen and the sofa with her walker. States she has been very sleepy for the last 3 days and feels that she's been weaker. Daughter states that she dispenses all of patient's medications and she does not manage them on her own. States she goes from the bed to the kitchen to the sofa and then she falls asleep, sleeps most of the day and has been doing that for the last three days but it is not baseline for her. Notified that ,per Dr Analilia Modi, patient is ready for discharge. States she and her brother were planning on coming to get patient at 2pm.  Asked that I call her brother Jackie Collins as he is POA ([phone]). Call to Jackie Collins and message left.

## 2019-01-29 NOTE — DISCHARGE INSTRUCTIONS
Patient Education        Fatigue: Care Instructions  Your Care Instructions    Fatigue is a feeling of tiredness, exhaustion, or lack of energy. You may feel fatigue because of too much or not enough activity. It can also come from stress, lack of sleep, boredom, and poor diet. Many medical problems, such as viral infections, can cause fatigue. Emotional problems, especially depression, are often the cause of fatigue. Fatigue is most often a symptom of another problem. Treatment for fatigue depends on the cause. For example, if you have fatigue because you have a certain health problem, treating this problem also treats your fatigue. If depression or anxiety is the cause, treatment may help. Follow-up care is a key part of your treatment and safety. Be sure to make and go to all appointments, and call your doctor if you are having problems. It's also a good idea to know your test results and keep a list of the medicines you take. How can you care for yourself at home? · Get regular exercise. But don't overdo it. Go back and forth between rest and exercise. · Get plenty of rest.  · Eat a healthy diet. Do not skip meals, especially breakfast.  · Reduce your use of caffeine, tobacco, and alcohol. Caffeine is most often found in coffee, tea, cola drinks, and chocolate. · Limit medicines that can cause fatigue. This includes tranquilizers and cold and allergy medicines. When should you call for help? Watch closely for changes in your health, and be sure to contact your doctor if:    · You have new symptoms such as fever or a rash.     · Your fatigue gets worse.     · You have been feeling down, depressed, or hopeless. Or you may have lost interest in things that you usually enjoy.     · You are not getting better as expected. Where can you learn more? Go to http://deshawn-florencio.info/. Enter W498 in the search box to learn more about \"Fatigue: Care Instructions. \"  Current as of: September 23, 2018  Content Version: 11.9  © 2006-2018 [employer]. Care instructions adapted under license by [employer] (which disclaims liability or warranty for this information). If you have questions about a medical condition or this instruction, always ask your healthcare professional. [address] any warranty or liability for your use of this information. Patient Education        Urinary Tract Infection in Women: Care Instructions  Your Care Instructions    A urinary tract infection, or UTI, is a general term for an infection anywhere between the kidneys and the urethra (where urine comes out). Most UTIs are bladder infections. They often cause pain or burning when you urinate. UTIs are caused by bacteria and can be cured with antibiotics. Be sure to complete your treatment so that the infection goes away. Follow-up care is a key part of your treatment and safety. Be sure to make and go to all appointments, and call your doctor if you are having problems. It's also a good idea to know your test results and keep a list of the medicines you take. How can you care for yourself at home? · Take your antibiotics as directed. Do not stop taking them just because you feel better. You need to take the full course of antibiotics. · Drink extra water and other fluids for the next day or two. This may help wash out the bacteria that are causing the infection. (If you have kidney, heart, or liver disease and have to limit fluids, talk with your doctor before you increase your fluid intake.)  · Avoid drinks that are carbonated or have caffeine. They can irritate the bladder. · Urinate often. Try to empty your bladder each time. · To relieve pain, take a hot bath or lay a heating pad set on low over your lower belly or genital area. Never go to sleep with a heating pad in place. To prevent UTIs  · Drink plenty of water each day.  This helps you urinate often, which clears bacteria from your system. (If you have kidney, heart, or liver disease and have to limit fluids, talk with your doctor before you increase your fluid intake.)  · Urinate when you need to. · Urinate right after you have sex. · Change sanitary pads often. · Avoid douches, bubble baths, feminine hygiene sprays, and other feminine hygiene products that have deodorants. · After going to the bathroom, wipe from front to back. When should you call for help? Call your doctor now or seek immediate medical care if:    · Symptoms such as fever, chills, nausea, or vomiting get worse or appear for the first time.     · You have new pain in your back just below your rib cage. This is called flank pain.     · There is new blood or pus in your urine.     · You have any problems with your antibiotic medicine.    Watch closely for changes in your health, and be sure to contact your doctor if:    · You are not getting better after taking an antibiotic for 2 days.     · Your symptoms go away but then come back. Where can you learn more? Go to http://deshawn-florencio.info/. Enter Y110 in the search box to learn more about \"Urinary Tract Infection in Women: Care Instructions. \"  Current as of: March 20, 2018  Content Version: 11.9  © 5831-6512 [employer]. Care instructions adapted under license by [employer] (which disclaims liability or warranty for this information). If you have questions about a medical condition or this instruction, always ask your healthcare professional. [place] [address] any warranty or liability for your use of this information.

## 2019-01-29 NOTE — ED TRIAGE NOTES
EMS reports that the pt was found in the floor tonight with weakness. Family members state that the pt has had multiple falls over the last few weeks and the daughter is concerned for the pt's safety

## 2019-01-29 NOTE — PROGRESS NOTES
Patient's son Ekta Baldwin returned my call stating that patient has 'bouts' of this behavior whenever she gets a UTI and is pleased she will be going home with IV antibiotics. States patient's daughter has diabetes and is not feeling well today and would like for me to arrange an ambulance ride home for patient. Advised I would be happy to but not sure if it would be covered and son states that is fine. Sheila,  calling for transport back to patient's home. Son states daughter will be home to receive her when she gets there.

## 2019-01-29 NOTE — ED PROVIDER NOTES
Patient lives with her daughter when she fell at 4 AM.  She was too weak to get up off the floor. EMS was called out for assistance. They helped her to the restroom and the patient was too weak to get up off the toilet. Family told EMS patient has had multiple falls over the last few weeks. No specific injury. Patient does not complain of any pain today. Denies any headache or blurred vision. No chest pain or shortness of breath. No nausea vomiting diarrhea or constipation. No dysuria. The history is provided by the patient. No  was used. Fall The accident occurred 3 to 5 hours ago. The fall occurred while walking. She fell from a height of ground level. She landed on carpet. There was no blood loss. The patient is experiencing no pain. She was not ambulatory at the scene. Pertinent negatives include no visual change, no fever, no numbness, no abdominal pain, no bowel incontinence, no nausea, no vomiting, no hematuria, no headaches, no extremity weakness, no loss of consciousness, no tingling and no laceration. The risk factors include recurrent falls and being elderly. She has tried nothing for the symptoms. Past Medical History:  
Diagnosis Date  Acute renal failure [facility]) June, 2008 Admitted with confusion and creatinine of 3.6. Improved with conservative management.  Arthritis  Asthma  CAD (coronary artery disease) MI 1991, stents placed 2007 & 2010  COPD (chronic obstructive pulmonary disease) ([facility])   
 inhalers daily  Deep vein thrombosis of lower leg ([facility] [address] June, 2008 Developed right leg DVT while hospitalized  Diabetes ([facility] [address] Type 2, on insulin, does not regularly check BG at home  Diabetes mellitus ([facility]  Dislocation of right shoulder joint   
 multiple  Dyspnea on exertion October, 2010 Admitted for cath and underwent PCI.  GERD (gastroesophageal reflux disease)  High cholesterol  Hypertension managed with medications  Hypothyroid  Morbid obesity (HCC)   
 bmi>50  Myocardial infarction ([facility])   Osteomyelitis of left foot ([facility]) ? ??  
 Restless leg syndrome  Sleep apnea   
 uses cpap machine Past Surgical History:  
Procedure Laterality Date  CARDIAC SURG PROCEDURE UNLIST    
 4 stents  HX ADENOIDECTOMY [facility] Resection of right cyst  
 [address] &   HX HERNIA REPAIR  2006  HX KNEE REPLACEMENT  May, 2008 Right  HX ORTHOPAEDIC Debridemnt of left foot osteomyelitits, rt shoulder replacement  HX ORTHOPAEDIC    
 shoulder surgery  HX TONSIL AND ADENOIDECTOMY  Childhood  HX TONSILLECTOMY [facility] Family History:  
Problem Relation Age of Onset  Heart Disease Father  Heart Disease Son   
 
 
Social History Socioeconomic History  Marital status:  Spouse name: Not on file  Number of children: Not on file  Years of education: Not on file  Highest education level: Not on file Social Needs  Financial resource strain: Not on file  Food insecurity - worry: Not on file  Food insecurity - inability: Not on file  Transportation needs - medical: Not on file  Transportation needs - non-medical: Not on file Occupational History  Not on file Tobacco Use  Smoking status: Former Smoker Types: Cigarettes Last attempt to quit: 10/4/1983 Years since quittin.3  Smokeless tobacco: Never Used Substance and Sexual Activity  Alcohol use: No  
 Drug use: No  
 Sexual activity: No  
Other Topics Concern  Not on file Social History Narrative  Not on file ALLERGIES: Coenzyme q10; Niacin (inositol niacinate); Adhesive tape-silicones; Ambien [zolpidem]; Keflex [cephalexin]; Oxycodone; Pcn [penicillins]; and Ramipril Review of Systems Constitutional: Positive for fatigue. Negative for chills and fever. HENT: Negative for rhinorrhea and sore throat. Eyes: Negative for pain and redness. Respiratory: Negative for chest tightness, shortness of breath and wheezing. Cardiovascular: Negative for chest pain and leg swelling. Gastrointestinal: Negative for abdominal pain, bowel incontinence, diarrhea, nausea and vomiting. Genitourinary: Negative for dysuria and hematuria. Musculoskeletal: Negative for back pain, extremity weakness, gait problem, neck pain and neck stiffness. Skin: Negative for color change and rash. Neurological: Positive for weakness. Negative for tingling, loss of consciousness, numbness and headaches. Vitals:  
 01/29/19 1844 01/29/19 1435 Temp:  98.1 °F (36.7 °C) SpO2: 97% Physical Exam  
Constitutional: She is oriented to person, place, and time. She appears well-developed and well-nourished. No distress. HENT:  
Head: Normocephalic and atraumatic. Neck: Normal range of motion. Neck supple. Cardiovascular: Normal rate and regular rhythm. Pulmonary/Chest: Effort normal and breath sounds normal.  
Abdominal: Soft. Bowel sounds are normal. There is no tenderness. Musculoskeletal: Normal range of motion. She exhibits no edema. Neurological: She is alert and oriented to person, place, and time. No cranial nerve deficit. Skin: Skin is warm and dry. No laceration noted. MDM Number of Diagnoses or Management Options Diagnosis management comments: Patient with fatigue and weakness. Questionable early UTI. We'll treat at home and discharge. Amount and/or Complexity of Data Reviewed Clinical lab tests: ordered and reviewed Tests in the radiology section of CPT®: ordered and reviewed Tests in the medicine section of CPT®: ordered and reviewed Patient Progress Patient progress: stable Procedures Results Include: Recent Results (from the past 24 hour(s)) CBC WITH AUTOMATED DIFF Collection Time: 01/29/19  8:26 AM  
Result Value Ref Range WBC 12.7 (H) 4.3 - 11.1 K/uL  
 RBC 5.04 4.05 - 5.2 M/uL  
 HGB 15.0 11.7 - 15.4 g/dL HCT 46.2 35.8 - 46.3 % MCV 91.7 79.6 - 97.8 FL  
 MCH 29.8 26.1 - 32.9 PG  
 MCHC 32.5 31.4 - 35.0 g/dL  
 RDW 13.6 11.9 - 14.6 % PLATELET 700 874 - 298 K/uL MPV 12.6 (H) 9.4 - 12.3 FL ABSOLUTE NRBC 0.00 0.0 - 0.2 K/uL  
 DF AUTOMATED NEUTROPHILS 68 43 - 78 % LYMPHOCYTES 22 13 - 44 % MONOCYTES 10 4.0 - 12.0 % EOSINOPHILS 0 (L) 0.5 - 7.8 % BASOPHILS 0 0.0 - 2.0 % IMMATURE GRANULOCYTES 0 0.0 - 5.0 %  
 ABS. NEUTROPHILS 8.6 (H) 1.7 - 8.2 K/UL  
 ABS. LYMPHOCYTES 2.8 0.5 - 4.6 K/UL  
 ABS. MONOCYTES 1.2 0.1 - 1.3 K/UL  
 ABS. EOSINOPHILS 0.0 0.0 - 0.8 K/UL  
 ABS. BASOPHILS 0.0 0.0 - 0.2 K/UL  
 ABS. IMM. GRANS. 0.0 0.0 - 0.5 K/UL METABOLIC PANEL, COMPREHENSIVE Collection Time: 01/29/19  8:26 AM  
Result Value Ref Range Sodium 140 136 - 145 mmol/L Potassium 4.5 3.5 - 5.1 mmol/L Chloride 104 98 - 107 mmol/L  
 CO2 24 21 - 32 mmol/L Anion gap 12 mmol/L Glucose 130 (H) 65 - 100 mg/dL BUN 49 (H) 8 - 23 MG/DL Creatinine 1.71 (H) 0.6 - 1.0 MG/DL  
 GFR est AA 37 (L) >60 ml/min/1.73m2 GFR est non-AA 31 ml/min/1.73m2 Calcium 10.1 8.3 - 10.4 MG/DL Bilirubin, total 0.6 0.2 - 1.1 MG/DL  
 ALT (SGPT) 52 12 - 65 U/L  
 AST (SGOT) 139 (H) 15 - 37 U/L Alk. phosphatase 89 50 - 130 U/L Protein, total 7.6 g/dL Albumin 3.7 3.2 - 4.6 g/dL Globulin 3.9 (H) 2.3 - 3.5 g/dL A-G Ratio 0.9 URINE MICROSCOPIC Collection Time: 01/29/19  9:05 AM  
Result Value Ref Range WBC 5-10 0 /hpf  
 RBC 0 0 /hpf Epithelial cells 0-3 0 /hpf Bacteria TRACE 0 /hpf Casts 0-3 0 /lpf Crystals, urine 0 0 /LPF Mucus 0 0 /lpf Other observations RESULTS VERIFIED MANUALLY    
 
XR CHEST SNGL V (Final result) Result time 01/29/19 07:10:12  
 Final result by Kathleen Diane MD (01/29/19 07:10:12) Impression:  
 IMPRESSION: 
1. No definite evidence of effusion and/or infiltrate. Narrative:  
 History: Productive cough. Comments: 
 
Portable erect view of the chest is provided and compared to a prior exam dated November 1, 2013. FINDINGS: 
 
No evidence of effusion and/or infiltrate. Scarring is noted in the left lower 
lobe. Chronic elevation of the right hemidiaphragm. The heart is enlarged 
however stable. Mediastinal contours unremarkable. Scoliotic changes are noted 
within the thoracic spine.  Total right shoulder replacement.

## 2019-01-29 NOTE — ED NOTES
The pt stated that she has global body aches. The pt was palpated and the tenderness any where on her body did not change.

## 2019-01-29 NOTE — ED NOTES
I have reviewed discharge instructions with the patient. The patient verbalized understanding. Patient left ED via EMS to Home with EMS. Opportunity for questions and clarification provided. Patient given 1 scripts. Juan C Multani RN To continue your aftercare when you leave the hospital, you may receive an automated call from our care team to check in on how you are doing. This is a free service and part of our promise to provide the best care and service to meet your aftercare needs.  If you have questions, or wish to unsubscribe from this service please call [phone]. Thank you for Choosing our [facility] Emergency Department.

## 2019-03-13 ENCOUNTER — APPOINTMENT (OUTPATIENT)
Dept: CT IMAGING | Age: 79
End: 2019-03-13
Attending: EMERGENCY MEDICINE
Payer: MEDICARE

## 2019-03-13 ENCOUNTER — HOSPITAL ENCOUNTER (EMERGENCY)
Age: 79
Discharge: HOME | End: 2019-03-14
Attending: EMERGENCY MEDICINE
Payer: MEDICARE
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
## 2019-03-13 DIAGNOSIS — S01.01XA LACERATION OF SCALP, INITIAL ENCOUNTER: ICD-10-CM

## 2019-03-13 PROCEDURE — 75810000293 HC SIMP/SUPERF WND  RPR: Performed by: EMERGENCY MEDICINE

## 2019-03-13 PROCEDURE — 99284 EMERGENCY DEPT VISIT MOD MDM: CPT | Performed by: EMERGENCY MEDICINE

## 2019-03-13 PROCEDURE — 70450 CT HEAD/BRAIN W/O DYE: CPT

## 2019-03-14 VITALS
TEMPERATURE: 97.9 F | OXYGEN SATURATION: 99 % | SYSTOLIC BLOOD PRESSURE: 207 MMHG | RESPIRATION RATE: 16 BRPM | BODY MASS INDEX: 43.08 KG/M2 | HEART RATE: 56 BPM | WEIGHT: 228 LBS | DIASTOLIC BLOOD PRESSURE: 90 MMHG

## 2019-03-14 PROCEDURE — 77030008462 HC STPLR SKN PROX J&J -A

## 2019-03-14 PROCEDURE — 75810000293 HC SIMP/SUPERF WND  RPR: Performed by: EMERGENCY MEDICINE

## 2019-03-14 NOTE — ED PROVIDER NOTES
81-year-old lady presents with concerns about falling and hitting her head on the corner of a dresser. She said that she now has a headache. Patient says that she has no nausea or vomiting and noted of the breathing. Patient's blood pressure was initially recording is high. She does have a history of high blood pressure. Her arm is exceptionally obese and are constant do not fit well at all. I think her pressure is probably a little bit elevated because of the pain and anxiety of being in the most prominent but I do not think she is having any urgent emergent symptoms related to it and I think she can go back to her rehabilitation facility and take her blood pressure medicines as normal in the morning. I do not think she needs specific treatment for it at this time. Elements of this note were created using speech recognition software. As such, errors of speech recognition may be present. Past Medical History:   Diagnosis Date    Acute renal failure ([address] June, 2008    Admitted with confusion and creatinine of 3.6. Improved with conservative management.  Arthritis     Asthma     CAD (coronary artery disease)     MI 1991, stents placed 2007 & 2010    COPD (chronic obstructive pulmonary disease) ([facility])     inhalers daily    Deep vein thrombosis of lower leg ([address] June, 2008    Developed right leg DVT while hospitalized    Diabetes ([address]     Type 2, on insulin, does not regularly check BG at home    Diabetes mellitus ([address]     Dislocation of right shoulder joint     multiple    Dyspnea on exertion October, 2010    Admitted for cath and underwent PCI.  GERD (gastroesophageal reflux disease)     High cholesterol     Hypertension     managed with medications    Hypothyroid     Morbid obesity ([address]     bmi>50    Myocardial infarction ([address] 1991    Osteomyelitis of left foot ([address] ? ??    Restless leg syndrome     Sleep apnea     uses cpap machine       Past Surgical History: Procedure Laterality Date    CARDIAC SURG PROCEDURE UNLIST      4 stents    HX ADENOIDECTOMY      HX BREAST BIOPSY      Resection of right cyst    HX CORONARY STENT PLACEMENT   &     HX HERNIA REPAIR  2006    HX KNEE REPLACEMENT  May, 2008    Right    HX ORTHOPAEDIC      Debridemnt of left foot osteomyelitits, rt shoulder replacement    HX ORTHOPAEDIC      shoulder surgery    HX TONSIL AND ADENOIDECTOMY  Childhood    HX TONSILLECTOMY      HX TUBAL LIGATION           Family History:   Problem Relation Age of Onset    Heart Disease Father     Heart Disease Son        Social History     Socioeconomic History    Marital status:      Spouse name: Not on file    Number of children: Not on file    Years of education: Not on file    Highest education level: Not on file   Social Needs    Financial resource strain: Not on file    Food insecurity - worry: Not on file    Food insecurity - inability: Not on file   [employer] needs - medical: Not on file   [employer] needs - non-medical: Not on file   Occupational History    Not on file   Tobacco Use    Smoking status: Former Smoker     Types: Cigarettes     Last attempt to quit: 10/4/1983     Years since quittin.4    Smokeless tobacco: Never Used   Substance and Sexual Activity    Alcohol use: No    Drug use: No    Sexual activity: No   Other Topics Concern    Not on file   Social History Narrative    Not on file         ALLERGIES: Coenzyme q10; Niacin (inositol niacinate); Adhesive tape-silicones; Ambien [zolpidem]; Keflex [cephalexin]; Oxycodone; Pcn [penicillins]; and Ramipril    Review of Systems   Constitutional: Negative for chills and fever. Respiratory: Negative. Cardiovascular: Negative. Musculoskeletal: Negative for arthralgias and myalgias. Skin: Positive for wound. Negative for color change. Neurological: Positive for headaches.        Vitals:    19 2336   BP: (!) 214/91   Pulse: (!) 58   Resp: 16   Temp: 97.9 °F (36.6 °C)   SpO2: 100%   Weight: 103.4 kg (228 lb)            Physical Exam   Constitutional: She is oriented to person, place, and time. She appears well-developed and well-nourished. HENT:   Hematoma with a half cm laceration in her right posterior scalp   Cardiovascular: Normal rate and regular rhythm. Pulmonary/Chest: Effort normal and breath sounds normal.   Neurological: She is alert and oriented to person, place, and time. Skin: Skin is warm and dry. Nursing note and vitals reviewed. MDM  Number of Diagnoses or Management Options  Diagnosis management comments: Head CT is negative. I will discharge her back. Wound Repair  Date/Time: 3/14/2019 12:59 AM  Performed by: attendingLocation details: scalp  Wound length:2.5 cm or less  Foreign bodies: no foreign bodies  Irrigation solution: saline  Debridement: none  Skin closure: staples  Number of sutures: 1  Approximation: close  Dressing: antibiotic ointment  Patient tolerance: Patient tolerated the procedure well with no immediate complications  My total time at bedside, performing this procedure was 1-15 minutes.

## 2019-03-14 NOTE — ED NOTES
I have reviewed discharge instructions with the patient. The patient verbalized understanding. Patient left ED via Discharge Method: wheelchair to [address] with son (POV). Opportunity for questions and clarification provided. Patient given 0 scripts. Call placed to the [facility] to inform of pt's discharge. To continue your aftercare when you leave the hospital, you may receive an automated call from our care team to check in on how you are doing. This is a free service and part of our promise to provide the best care and service to meet your aftercare needs.  If you have questions, or wish to unsubscribe from this service please call [phone]. Thank you for Choosing our [facility] Emergency Department.

## 2019-03-14 NOTE — ED TRIAGE NOTES
Patient [facility] [place] patient fell and hit her head, patient denies loc.   Patient takes 81mg Aspirin

## 2019-03-14 NOTE — DISCHARGE INSTRUCTIONS
Return with any fevers, spreading redness, drainage, worsening symptoms, or additional concerns. It may ooze blood for 12-24 hours and that is OK. Keep it clean with soap and water. Cover it with an antibacterial ointment and clean bandage 2-3 times daily. Follow up with a doctor to have the staples removed in 10-14 days.

## 2019-06-15 ENCOUNTER — HOSPITAL ENCOUNTER (OUTPATIENT)
Dept: LAB | Age: 79
Discharge: HOME | End: 2019-06-15

## 2019-06-15 LAB
ANION GAP SERPL CALC-SCNC: 8 MMOL/L (ref 7–16)
APPEARANCE UR: ABNORMAL
BACTERIA URNS QL MICRO: ABNORMAL /HPF
BILIRUB UR QL: NEGATIVE
BUN SERPL-MCNC: 32 MG/DL (ref 8–23)
CALCIUM SERPL-MCNC: 9.3 MG/DL (ref 8.3–10.4)
CASTS URNS QL MICRO: ABNORMAL /LPF
CHLORIDE SERPL-SCNC: 103 MMOL/L (ref 98–107)
CO2 SERPL-SCNC: 29 MMOL/L (ref 21–32)
COLOR UR: YELLOW
CREAT SERPL-MCNC: 0.99 MG/DL (ref 0.6–1)
EPI CELLS #/AREA URNS HPF: 0 /HPF
ERYTHROCYTE [DISTWIDTH] IN BLOOD BY AUTOMATED COUNT: 13.3 % (ref 11.9–14.6)
GLUCOSE SERPL-MCNC: 203 MG/DL (ref 65–100)
GLUCOSE UR STRIP.AUTO-MCNC: NEGATIVE MG/DL
HCT VFR BLD AUTO: 40.2 % (ref 35.8–46.3)
HGB BLD-MCNC: 12.8 G/DL (ref 11.7–15.4)
HGB UR QL STRIP: NEGATIVE
KETONES UR QL STRIP.AUTO: NEGATIVE MG/DL
LEUKOCYTE ESTERASE UR QL STRIP.AUTO: ABNORMAL
MCH RBC QN AUTO: 30.1 PG (ref 26.1–32.9)
MCHC RBC AUTO-ENTMCNC: 31.8 G/DL (ref 31.4–35)
MCV RBC AUTO: 94.6 FL (ref 79.6–97.8)
NITRITE UR QL STRIP.AUTO: NEGATIVE
NRBC # BLD: 0 K/UL (ref 0–0.2)
PH UR STRIP: 7.5 [PH] (ref 5–9)
PLATELET # BLD AUTO: 204 K/UL (ref 150–450)
PMV BLD AUTO: 12.3 FL (ref 9.4–12.3)
POTASSIUM SERPL-SCNC: 3.9 MMOL/L (ref 3.5–5.1)
PROT UR STRIP-MCNC: NEGATIVE MG/DL
RBC # BLD AUTO: 4.25 M/UL (ref 4.05–5.2)
RBC #/AREA URNS HPF: ABNORMAL /HPF
SODIUM SERPL-SCNC: 140 MMOL/L (ref 136–145)
SP GR UR REFRACTOMETRY: 1.01 (ref 1–1.02)
UROBILINOGEN UR QL STRIP.AUTO: 1 EU/DL (ref 0.2–1)
WBC # BLD AUTO: 11 K/UL (ref 4.3–11.1)
WBC URNS QL MICRO: ABNORMAL /HPF

## 2019-06-15 PROCEDURE — 81001 URINALYSIS AUTO W/SCOPE: CPT

## 2019-06-15 PROCEDURE — 85027 COMPLETE CBC AUTOMATED: CPT

## 2019-06-15 PROCEDURE — 87088 URINE BACTERIA CULTURE: CPT

## 2019-06-15 PROCEDURE — 80048 BASIC METABOLIC PNL TOTAL CA: CPT

## 2019-06-15 PROCEDURE — 87086 URINE CULTURE/COLONY COUNT: CPT

## 2019-06-15 PROCEDURE — 87186 SC STD MICRODIL/AGAR DIL: CPT

## 2019-06-19 LAB
BACTERIA SPEC CULT: ABNORMAL
BACTERIA SPEC CULT: ABNORMAL
SERVICE CMNT-IMP: ABNORMAL

## 2020-05-09 ENCOUNTER — APPOINTMENT (OUTPATIENT)
Dept: MRI IMAGING | Age: 80
DRG: 871 | End: 2020-05-09
Attending: FAMILY MEDICINE
Payer: MEDICARE

## 2020-05-09 ENCOUNTER — HOSPITAL ENCOUNTER (EMERGENCY)
Age: 80
Discharge: ANOTHER HOSPITAL | DRG: 871 | End: 2020-05-09
Attending: EMERGENCY MEDICINE
Payer: MEDICARE

## 2020-05-09 ENCOUNTER — HOSPITAL ENCOUNTER (INPATIENT)
Age: 80
LOS: 3 days | Discharge: HOSPICE | DRG: 871 | End: 2020-05-12
Attending: INTERNAL MEDICINE | Admitting: FAMILY MEDICINE
Payer: MEDICARE

## 2020-05-09 ENCOUNTER — APPOINTMENT (OUTPATIENT)
Dept: CT IMAGING | Age: 80
DRG: 871 | End: 2020-05-09
Attending: EMERGENCY MEDICINE
Payer: MEDICARE

## 2020-05-09 ENCOUNTER — APPOINTMENT (OUTPATIENT)
Dept: GENERAL RADIOLOGY | Age: 80
DRG: 871 | End: 2020-05-09
Attending: FAMILY MEDICINE
Payer: MEDICARE

## 2020-05-09 VITALS
TEMPERATURE: 98.8 F | BODY MASS INDEX: 39.3 KG/M2 | RESPIRATION RATE: 18 BRPM | DIASTOLIC BLOOD PRESSURE: 95 MMHG | OXYGEN SATURATION: 96 % | SYSTOLIC BLOOD PRESSURE: 203 MMHG | HEART RATE: 55 BPM | WEIGHT: 208 LBS

## 2020-05-09 DIAGNOSIS — G93.40 ACUTE ENCEPHALOPATHY: Primary | ICD-10-CM

## 2020-05-09 DIAGNOSIS — G93.41 ACUTE METABOLIC ENCEPHALOPATHY: ICD-10-CM

## 2020-05-09 PROBLEM — I10 HYPERTENSION: Status: ACTIVE | Noted: 2020-05-09

## 2020-05-09 LAB
ALBUMIN SERPL-MCNC: 3.5 G/DL (ref 3.2–4.6)
ALBUMIN/GLOB SERPL: 1 {RATIO} (ref 1.2–3.5)
ALP SERPL-CCNC: 101 U/L (ref 50–136)
ALT SERPL-CCNC: 20 U/L (ref 12–65)
ANION GAP SERPL CALC-SCNC: 10 MMOL/L (ref 7–16)
AST SERPL-CCNC: 25 U/L (ref 15–37)
BACTERIA URNS QL MICRO: 0 /HPF
BASOPHILS # BLD: 0.1 K/UL (ref 0–0.2)
BASOPHILS NFR BLD: 0 % (ref 0–2)
BILIRUB SERPL-MCNC: 0.8 MG/DL (ref 0.2–1.1)
BUN SERPL-MCNC: 14 MG/DL (ref 8–23)
CALCIUM SERPL-MCNC: 9.9 MG/DL (ref 8.3–10.4)
CASTS URNS QL MICRO: 0 /LPF
CHLORIDE SERPL-SCNC: 108 MMOL/L (ref 98–107)
CK SERPL-CCNC: 70 U/L (ref 21–215)
CO2 SERPL-SCNC: 23 MMOL/L (ref 21–32)
CREAT SERPL-MCNC: 0.74 MG/DL (ref 0.6–1)
CRYSTALS URNS QL MICRO: 0 /LPF
DIFFERENTIAL METHOD BLD: ABNORMAL
EOSINOPHIL # BLD: 0 K/UL (ref 0–0.8)
EOSINOPHIL NFR BLD: 0 % (ref 0.5–7.8)
EPI CELLS #/AREA URNS HPF: NORMAL /HPF
ERYTHROCYTE [DISTWIDTH] IN BLOOD BY AUTOMATED COUNT: 13.3 % (ref 11.9–14.6)
GLOBULIN SER CALC-MCNC: 3.4 G/DL (ref 2.3–3.5)
GLUCOSE BLD STRIP.AUTO-MCNC: 203 MG/DL (ref 65–100)
GLUCOSE SERPL-MCNC: 111 MG/DL (ref 65–100)
HCT VFR BLD AUTO: 43.5 % (ref 35.8–46.3)
HGB BLD-MCNC: 14.2 G/DL (ref 11.7–15.4)
IMM GRANULOCYTES # BLD AUTO: 0 K/UL (ref 0–0.5)
IMM GRANULOCYTES NFR BLD AUTO: 0 % (ref 0–5)
LACTATE SERPL-SCNC: 0.9 MMOL/L (ref 0.4–2)
LYMPHOCYTES # BLD: 2.9 K/UL (ref 0.5–4.6)
LYMPHOCYTES NFR BLD: 21 % (ref 13–44)
MCH RBC QN AUTO: 30.1 PG (ref 26.1–32.9)
MCHC RBC AUTO-ENTMCNC: 32.6 G/DL (ref 31.4–35)
MCV RBC AUTO: 92.2 FL (ref 79.6–97.8)
MONOCYTES # BLD: 0.8 K/UL (ref 0.1–1.3)
MONOCYTES NFR BLD: 6 % (ref 4–12)
MUCOUS THREADS URNS QL MICRO: 0 /LPF
NEUTS SEG # BLD: 9.9 K/UL (ref 1.7–8.2)
NEUTS SEG NFR BLD: 73 % (ref 43–78)
NRBC # BLD: 0 K/UL (ref 0–0.2)
OTHER OBSERVATIONS,UCOM: NORMAL
PLATELET # BLD AUTO: 246 K/UL (ref 150–450)
PMV BLD AUTO: 11.9 FL (ref 9.4–12.3)
POTASSIUM SERPL-SCNC: 4 MMOL/L (ref 3.5–5.1)
PROCALCITONIN SERPL-MCNC: <0.05 NG/ML
PROT SERPL-MCNC: 6.9 G/DL (ref 6.3–8.2)
RBC # BLD AUTO: 4.72 M/UL (ref 4.05–5.2)
RBC #/AREA URNS HPF: NORMAL /HPF
SODIUM SERPL-SCNC: 141 MMOL/L (ref 136–145)
WBC # BLD AUTO: 13.6 K/UL (ref 4.3–11.1)
WBC URNS QL MICRO: NORMAL /HPF

## 2020-05-09 PROCEDURE — A9575 INJ GADOTERATE MEGLUMI 0.1ML: HCPCS | Performed by: INTERNAL MEDICINE

## 2020-05-09 PROCEDURE — 74011250636 HC RX REV CODE- 250/636: Performed by: FAMILY MEDICINE

## 2020-05-09 PROCEDURE — 70450 CT HEAD/BRAIN W/O DYE: CPT

## 2020-05-09 PROCEDURE — 81015 MICROSCOPIC EXAM OF URINE: CPT

## 2020-05-09 PROCEDURE — 96365 THER/PROPH/DIAG IV INF INIT: CPT

## 2020-05-09 PROCEDURE — 83735 ASSAY OF MAGNESIUM: CPT

## 2020-05-09 PROCEDURE — 95816 EEG AWAKE AND DROWSY: CPT | Performed by: FAMILY MEDICINE

## 2020-05-09 PROCEDURE — 74011250636 HC RX REV CODE- 250/636: Performed by: EMERGENCY MEDICINE

## 2020-05-09 PROCEDURE — 82550 ASSAY OF CK (CPK): CPT

## 2020-05-09 PROCEDURE — 74011000258 HC RX REV CODE- 258: Performed by: FAMILY MEDICINE

## 2020-05-09 PROCEDURE — 87040 BLOOD CULTURE FOR BACTERIA: CPT

## 2020-05-09 PROCEDURE — 82962 GLUCOSE BLOOD TEST: CPT

## 2020-05-09 PROCEDURE — 74011000250 HC RX REV CODE- 250: Performed by: FAMILY MEDICINE

## 2020-05-09 PROCEDURE — 87086 URINE CULTURE/COLONY COUNT: CPT

## 2020-05-09 PROCEDURE — 99285 EMERGENCY DEPT VISIT HI MDM: CPT

## 2020-05-09 PROCEDURE — 77010033678 HC OXYGEN DAILY

## 2020-05-09 PROCEDURE — 94640 AIRWAY INHALATION TREATMENT: CPT

## 2020-05-09 PROCEDURE — 84145 PROCALCITONIN (PCT): CPT

## 2020-05-09 PROCEDURE — 74011250637 HC RX REV CODE- 250/637: Performed by: FAMILY MEDICINE

## 2020-05-09 PROCEDURE — 83605 ASSAY OF LACTIC ACID: CPT

## 2020-05-09 PROCEDURE — 36415 COLL VENOUS BLD VENIPUNCTURE: CPT

## 2020-05-09 PROCEDURE — 74011636637 HC RX REV CODE- 636/637: Performed by: FAMILY MEDICINE

## 2020-05-09 PROCEDURE — 80053 COMPREHEN METABOLIC PANEL: CPT

## 2020-05-09 PROCEDURE — 65270000029 HC RM PRIVATE

## 2020-05-09 PROCEDURE — 70553 MRI BRAIN STEM W/O & W/DYE: CPT

## 2020-05-09 PROCEDURE — 85025 COMPLETE CBC W/AUTO DIFF WBC: CPT

## 2020-05-09 PROCEDURE — 74011250636 HC RX REV CODE- 250/636: Performed by: INTERNAL MEDICINE

## 2020-05-09 PROCEDURE — 84443 ASSAY THYROID STIM HORMONE: CPT

## 2020-05-09 RX ORDER — GADOTERATE MEGLUMINE 376.9 MG/ML
20 INJECTION INTRAVENOUS
Status: COMPLETED | OUTPATIENT
Start: 2020-05-09 | End: 2020-05-09

## 2020-05-09 RX ORDER — GUAIFENESIN 100 MG/5ML
81 LIQUID (ML) ORAL DAILY
Status: DISCONTINUED | OUTPATIENT
Start: 2020-05-10 | End: 2020-05-12 | Stop reason: HOSPADM

## 2020-05-09 RX ORDER — METOPROLOL SUCCINATE 100 MG/1
100 TABLET, EXTENDED RELEASE ORAL DAILY
Status: DISCONTINUED | OUTPATIENT
Start: 2020-05-10 | End: 2020-05-12 | Stop reason: HOSPADM

## 2020-05-09 RX ORDER — BUDESONIDE 0.5 MG/2ML
500 INHALANT ORAL
Status: DISCONTINUED | OUTPATIENT
Start: 2020-05-09 | End: 2020-05-12 | Stop reason: HOSPADM

## 2020-05-09 RX ORDER — GABAPENTIN 300 MG/1
300 CAPSULE ORAL 3 TIMES DAILY
Status: DISCONTINUED | OUTPATIENT
Start: 2020-05-09 | End: 2020-05-12 | Stop reason: HOSPADM

## 2020-05-09 RX ORDER — AMOXICILLIN 250 MG
2 CAPSULE ORAL
Status: DISCONTINUED | OUTPATIENT
Start: 2020-05-09 | End: 2020-05-12 | Stop reason: HOSPADM

## 2020-05-09 RX ORDER — DIPHENHYDRAMINE HCL 25 MG
25 CAPSULE ORAL
Status: DISCONTINUED | OUTPATIENT
Start: 2020-05-09 | End: 2020-05-12 | Stop reason: HOSPADM

## 2020-05-09 RX ORDER — VANCOMYCIN/0.9 % SOD CHLORIDE 1.5G/250ML
1500 PLASTIC BAG, INJECTION (ML) INTRAVENOUS ONCE
Status: COMPLETED | OUTPATIENT
Start: 2020-05-09 | End: 2020-05-09

## 2020-05-09 RX ORDER — HYDRALAZINE HYDROCHLORIDE 20 MG/ML
20 INJECTION INTRAMUSCULAR; INTRAVENOUS
Status: DISCONTINUED | OUTPATIENT
Start: 2020-05-09 | End: 2020-05-10

## 2020-05-09 RX ORDER — ACETAMINOPHEN 325 MG/1
650 TABLET ORAL
Status: DISCONTINUED | OUTPATIENT
Start: 2020-05-09 | End: 2020-05-12 | Stop reason: HOSPADM

## 2020-05-09 RX ORDER — FLUOXETINE HYDROCHLORIDE 20 MG/1
20 CAPSULE ORAL DAILY
Status: DISCONTINUED | OUTPATIENT
Start: 2020-05-10 | End: 2020-05-12 | Stop reason: HOSPADM

## 2020-05-09 RX ORDER — BUDESONIDE AND FORMOTEROL FUMARATE DIHYDRATE 160; 4.5 UG/1; UG/1
2 AEROSOL RESPIRATORY (INHALATION)
Status: DISCONTINUED | OUTPATIENT
Start: 2020-05-09 | End: 2020-05-09 | Stop reason: CLARIF

## 2020-05-09 RX ORDER — ALBUTEROL SULFATE 0.83 MG/ML
2.5 SOLUTION RESPIRATORY (INHALATION)
Status: DISCONTINUED | OUTPATIENT
Start: 2020-05-09 | End: 2020-05-12 | Stop reason: HOSPADM

## 2020-05-09 RX ORDER — LORAZEPAM 2 MG/ML
2 INJECTION INTRAMUSCULAR
Status: DISCONTINUED | OUTPATIENT
Start: 2020-05-09 | End: 2020-05-10

## 2020-05-09 RX ORDER — VANCOMYCIN/0.9 % SOD CHLORIDE 1.5G/250ML
1500 PLASTIC BAG, INJECTION (ML) INTRAVENOUS
Status: DISCONTINUED | OUTPATIENT
Start: 2020-05-10 | End: 2020-05-10

## 2020-05-09 RX ORDER — INSULIN GLARGINE 100 [IU]/ML
15 INJECTION, SOLUTION SUBCUTANEOUS EVERY 12 HOURS
Status: DISCONTINUED | OUTPATIENT
Start: 2020-05-09 | End: 2020-05-10

## 2020-05-09 RX ORDER — ATORVASTATIN CALCIUM 80 MG/1
80 TABLET, FILM COATED ORAL
Status: DISCONTINUED | OUTPATIENT
Start: 2020-05-09 | End: 2020-05-12 | Stop reason: HOSPADM

## 2020-05-09 RX ORDER — ONDANSETRON 2 MG/ML
4 INJECTION INTRAMUSCULAR; INTRAVENOUS
Status: DISCONTINUED | OUTPATIENT
Start: 2020-05-09 | End: 2020-05-12 | Stop reason: HOSPADM

## 2020-05-09 RX ORDER — NALOXONE HYDROCHLORIDE 0.4 MG/ML
0.4 INJECTION, SOLUTION INTRAMUSCULAR; INTRAVENOUS; SUBCUTANEOUS AS NEEDED
Status: DISCONTINUED | OUTPATIENT
Start: 2020-05-09 | End: 2020-05-12 | Stop reason: HOSPADM

## 2020-05-09 RX ORDER — LORAZEPAM 2 MG/ML
2 INJECTION INTRAMUSCULAR
Status: DISCONTINUED | OUTPATIENT
Start: 2020-05-09 | End: 2020-05-09

## 2020-05-09 RX ORDER — INSULIN LISPRO 100 [IU]/ML
INJECTION, SOLUTION INTRAVENOUS; SUBCUTANEOUS
Status: DISCONTINUED | OUTPATIENT
Start: 2020-05-09 | End: 2020-05-10

## 2020-05-09 RX ORDER — HYDRALAZINE HYDROCHLORIDE 20 MG/ML
20 INJECTION INTRAMUSCULAR; INTRAVENOUS
Status: CANCELLED | OUTPATIENT
Start: 2020-05-09

## 2020-05-09 RX ORDER — VANCOMYCIN HYDROCHLORIDE 1 G/20ML
INJECTION, POWDER, LYOPHILIZED, FOR SOLUTION INTRAVENOUS EVERY 24 HOURS
Status: DISCONTINUED | OUTPATIENT
Start: 2020-05-09 | End: 2020-05-09 | Stop reason: DRUGHIGH

## 2020-05-09 RX ORDER — LEVOTHYROXINE SODIUM 75 UG/1
75 TABLET ORAL
Status: DISCONTINUED | OUTPATIENT
Start: 2020-05-10 | End: 2020-05-12 | Stop reason: HOSPADM

## 2020-05-09 RX ORDER — VALSARTAN 80 MG/1
320 TABLET ORAL DAILY
Status: DISCONTINUED | OUTPATIENT
Start: 2020-05-10 | End: 2020-05-12 | Stop reason: HOSPADM

## 2020-05-09 RX ORDER — METRONIDAZOLE 500 MG/100ML
500 INJECTION, SOLUTION INTRAVENOUS EVERY 8 HOURS
Status: DISCONTINUED | OUTPATIENT
Start: 2020-05-09 | End: 2020-05-10

## 2020-05-09 RX ADMIN — VANCOMYCIN HYDROCHLORIDE 1500 MG: 10 INJECTION, POWDER, LYOPHILIZED, FOR SOLUTION INTRAVENOUS at 20:50

## 2020-05-09 RX ADMIN — LEVETIRACETAM 1000 MG: 100 INJECTION, SOLUTION INTRAVENOUS at 20:45

## 2020-05-09 RX ADMIN — CEFTRIAXONE 1 G: 1 INJECTION, POWDER, FOR SOLUTION INTRAMUSCULAR; INTRAVENOUS at 18:45

## 2020-05-09 RX ADMIN — HYDRALAZINE HYDROCHLORIDE 20 MG: 20 INJECTION INTRAMUSCULAR; INTRAVENOUS at 18:36

## 2020-05-09 RX ADMIN — GADOTERATE MEGLUMINE 20 ML: 376.9 INJECTION INTRAVENOUS at 22:39

## 2020-05-09 RX ADMIN — GABAPENTIN 300 MG: 300 CAPSULE ORAL at 18:36

## 2020-05-09 RX ADMIN — INSULIN LISPRO 4 UNITS: 100 INJECTION, SOLUTION INTRAVENOUS; SUBCUTANEOUS at 22:46

## 2020-05-09 RX ADMIN — ALBUTEROL SULFATE 2.5 MG: 2.5 SOLUTION RESPIRATORY (INHALATION) at 22:44

## 2020-05-09 RX ADMIN — LORAZEPAM 2 MG: 2 INJECTION, SOLUTION INTRAMUSCULAR; INTRAVENOUS at 22:10

## 2020-05-09 RX ADMIN — SODIUM CHLORIDE 1000 ML: 900 INJECTION, SOLUTION INTRAVENOUS at 14:28

## 2020-05-09 RX ADMIN — BUDESONIDE 500 MCG: 0.5 INHALANT RESPIRATORY (INHALATION) at 22:44

## 2020-05-09 RX ADMIN — INSULIN GLARGINE 15 UNITS: 100 INJECTION, SOLUTION SUBCUTANEOUS at 22:56

## 2020-05-09 RX ADMIN — METRONIDAZOLE 500 MG: 500 INJECTION, SOLUTION INTRAVENOUS at 20:18

## 2020-05-09 RX ADMIN — VANCOMYCIN HYDROCHLORIDE 1000 MG: 1 INJECTION, POWDER, LYOPHILIZED, FOR SOLUTION INTRAVENOUS at 16:24

## 2020-05-09 RX ADMIN — LORAZEPAM 2 MG: 2 INJECTION INTRAMUSCULAR; INTRAVENOUS at 20:18

## 2020-05-09 NOTE — ED NOTES
Lab called r/t getting a second set of blood cultures. IV was inserted @ 7819. Only 3 mL syringes will draw from IV.

## 2020-05-09 NOTE — PROGRESS NOTES
TRANSFER - IN REPORT:    Verbal report received from Victorinoωφόροmassimo Ποσειδώνοmassimo Mandel, RN on Bao Edwards  being received from HOSPITAL [place] [id number] [facility] for routine progression of care      Report consisted of patients Situation, Background, Assessment and   Recommendations(SBAR). Information from the following report(s) SBAR was reviewed with the receiving nurse. Opportunity for questions and clarification was provided. Assessment completed upon patients arrival to unit and care assumed.

## 2020-05-09 NOTE — PROGRESS NOTES
05/09/20 1749   Dual Skin Pressure Injury Assessment   Dual Skin Pressure Injury Assessment WDL   Second Care Provider (Based on [address] [place] [address]) [address] [place] ,RN     Dual skin assessment complete. Yeast like rash noted under left breast , abdominal folds,and perineal area. Antifungal cream applied . Blanchable redness to bilateral buttocks applied optifoam as preventive. Old scar noted to right knee. Scattered bruising to upper extremity.

## 2020-05-09 NOTE — ED NOTES
TRANSFER - OUT REPORT:    Verbal report given to Tequila RN(name) on Gisele Rodriguez  being transferred to Med/Surg(unit) for routine progression of care       Report consisted of patients Situation, Background, Assessment and   Recommendations(SBAR). Information from the following report(s) SBAR was reviewed with the receiving nurse. Lines:   Peripheral IV 05/09/20 Right Antecubital (Active)   Site Assessment Clean, dry, & intact 5/9/2020  1:53 PM   Phlebitis Assessment 0 5/9/2020  1:53 PM   Infiltration Assessment 0 5/9/2020  1:53 PM   Dressing Status Clean, dry, & intact 5/9/2020  1:53 PM   Dressing Type Transparent 5/9/2020  1:53 PM   Hub Color/Line Status Pink;Patent; Flushed 5/9/2020  1:53 PM   Action Taken Blood drawn 5/9/2020  1:53 PM        Opportunity for questions and clarification was provided.       Patient transported with:   EMS

## 2020-05-09 NOTE — ED TRIAGE NOTES
Patient presents to the ER via EMS. EMS was called to scene by family stating patient has an altered mental status and smells increasingly of urine with urinary frequency. Patient is alert to name only. Her baseline is unknown. Patient is not a good historian. This is a new problem. Per family Ernie Champagne was normal when she went to bed last night. \"

## 2020-05-09 NOTE — PROGRESS NOTES
HUONG spoke with the patient's daughter Dougie Garcia ([phone]) who states that the patient and she live together. The patient uses a Rollator to ambulate and has had several falls over the past twelve months. The patient is independent in her ADLs and is no longer driving. Per Sally Krishnan, the patient was admitted to the hospital in the Spring and discharged to [facility]. Patient states that the patient came back home but denies having [facility] arranged at that time, states that she graduated from her rehab program. Sally Krishnan states that the patient has a son named James Fabian who lives locally as well. Patient anticipated to admit. Sally Krishnan states that the patient was altered the morning and unable to communicate or move. Patient would benefit from a PT/OT evaluation and then PPD/COVID 19 test should STR be recommended.      Niesha Walter, [facility]    [facility]    * [email or web address]    ( [phone]

## 2020-05-09 NOTE — H&P
Hospitalist Admission History and Physical     NAME:  La Garcia   Age:  67 y.o.  :   1940   MRN:   [id number]  PCP: Aide Logan MD  Consulting MD:  Treatment Team: Attending Provider: Eva Edwards MD    No chief complaint on file. HPI:   Patient is a 67 y.o. female who presented to the ED for cc altered mental status. Last known well last night. Unable to obtain HPI from patient due to confusion. Hx of HTN, HLD, DM type II, hypothyroidism, COPD, CAD    Vitals- /98    Labs- WBC 13.6. CT head with no acute changes. Past Medical History:   Diagnosis Date    Acute renal failure ([address]     Admitted with confusion and creatinine of 3.6. Improved with conservative management.  Arthritis     Asthma     CAD (coronary artery disease)     MI , stents placed  &     COPD (chronic obstructive pulmonary disease) ([facility])     inhalers daily    Deep vein thrombosis of lower leg ([address]     Developed right leg DVT while hospitalized    Diabetes ([address]     Type 2, on insulin, does not regularly check BG at home    Diabetes mellitus ([address]     Dislocation of right shoulder joint     multiple    Dyspnea on exertion     Admitted for cath and underwent PCI.  GERD (gastroesophageal reflux disease)     High cholesterol     Hypertension     managed with medications    Hypothyroid     Metabolic encephalopathy 4/3/0898    Morbid obesity ([address]     bmi>50    Myocardial infarction ([address]     Osteomyelitis of left foot ([address] ? ??    Restless leg syndrome     Sleep apnea     uses cpap machine        Past Surgical History:   Procedure Laterality Date    CARDIAC SURG PROCEDURE UNLIST      4 stents    HX ADENOIDECTOMY      HX BREAST BIOPSY  2000    Resection of right cyst    HX CORONARY STENT PLACEMENT   &     HX HERNIA REPAIR      HX KNEE REPLACEMENT  May, 2008    Right    HX ORTHOPAEDIC      Debridemnt of left foot osteomyelitits, rt shoulder replacement    HX ORTHOPAEDIC      shoulder surgery    HX TONSIL AND ADENOIDECTOMY  Childhood    HX TONSILLECTOMY      HX TUBAL LIGATION          Family History   Problem Relation Age of Onset    Heart Disease Father     Heart Disease Son        Social History     Social History Narrative    Not on file        Social History     Tobacco Use    Smoking status: Former Smoker     Types: Cigarettes     Last attempt to quit: 10/4/1983     Years since quittin.6    Smokeless tobacco: Never Used   Substance Use Topics    Alcohol use: No        Social History     Substance and Sexual Activity   Drug Use No         Allergies   Allergen Reactions    Coenzyme Q10 Rash    Niacin (Inositol Niacinate) Rash    Adhesive Tape-Silicones Rash     sts paper tape    Ambien [Zolpidem] Other (comments)     nightmares    Keflex [Cephalexin] Rash    Oxycodone Other (comments)     Slurred speech, hallucinations, droopy face, word finding issues    Pcn [Penicillins] Rash    Ramipril Other (comments)     nightmares       Prior to Admission medications    Medication Sig Start Date End Date Taking? Authorizing Provider   valsartan (DIOVAN) 320 mg tablet Take  by mouth two (2) times a day. Provider, Historical   metoprolol succinate (TOPROL-XL) 100 mg tablet Take 1 Tab by mouth daily. Take / use AM day of surgery with a sip of water per anesthesia protocols. Indications: hypertension 18   Bianca Arguelles MD   atorvastatin (LIPITOR) 80 mg tablet Take 1 Tab by mouth every evening. Indications: hypercholesterolemia 18   Bianca Arguelles MD   ACETAMINOPHEN/DIPHENHYDRAMINE (TYLENOL PM PO) Take  by mouth. Provider, Historical   Omega-3 Fatty Acids (FISH OIL) 300 mg cap Take  by mouth. Provider, Historical   gabapentin (NEURONTIN) 600 mg tablet Take  by mouth three (3) times daily.     Provider, Historical   nitroglycerin (NITROSTAT) 0.4 mg SL tablet by SubLINGual route every five (5) minutes as needed for Chest Pain. Provider, Historical   ascorbic acid, vitamin C, (VITAMIN C) 1,000 mg tablet Take  by mouth. Provider, Historical   multivitamin (ONE A DAY) tablet Take 1 Tab by mouth daily. Provider, Historical   aspirin delayed-release 81 mg tablet Take 1 Tab by mouth daily. 7/12/17   Karen Mccarthy MD   insulin glargine (LANTUS) 100 unit/mL injection 50 Units by SubCUTAneous route every morning. Provider, Historical   insulin glargine (LANTUS) 100 unit/mL injection 30 Units by SubCUTAneous route nightly. Provider, Historical   insulin regular (NOVOLIN R, HUMULIN R) 100 unit/mL injection by SubCUTAneous route. Regular Insulin 8 units before breakfast and 8 units before lunch and 10 units at bedtime    Provider, Historical   levothyroxine (SYNTHROID) 50 mcg tablet Take 75 mcg by mouth Daily (before breakfast). Take / use AM day of surgery with a sip of water per anesthesia protocols. Indications: hypothyroidism    Provider, Historical   budesonide-formoterol (SYMBICORT) 160-4.5 mcg/Actuation HFA inhaler Take 2 Puffs by inhalation two (2) times a day. Take / use AM day of surgery with a sip of water per anesthesia protocols. Josselyn Soares MD   albuterol (PROVENTIL, VENTOLIN) 90 mcg/Actuation inhaler Take 2 Puffs by inhalation every four (4) hours as needed for Shortness of Breath. Josselyn Soares MD   fluoxetine (PROZAC) 20 mg Tab Take 20 mg by mouth daily. Take / use AM day of surgery with a sip of water per anesthesia protocols. Josselyn Soares MD           Review of Systems    Cannot obtain due to AMS      Objective: There were no vitals taken for this visit. No intake/output data recorded. No intake/output data recorded.     Data Review:   Recent Results (from the past 24 hour(s))   CBC WITH AUTOMATED DIFF    Collection Time: 05/09/20  2:22 PM   Result Value Ref Range    WBC 13.6 (H) 4.3 - 11.1 K/uL    RBC 4.72 4.05 - 5.2 M/uL    HGB 14.2 11.7 - 15.4 g/dL HCT 43.5 35.8 - 46.3 %    MCV 92.2 79.6 - 97.8 FL    MCH 30.1 26.1 - 32.9 PG    MCHC 32.6 31.4 - 35.0 g/dL    RDW 13.3 11.9 - 14.6 %    PLATELET 397 788 - 205 K/uL    MPV 11.9 9.4 - 12.3 FL    ABSOLUTE NRBC 0.00 0.0 - 0.2 K/uL    DF AUTOMATED      NEUTROPHILS 73 43 - 78 %    LYMPHOCYTES 21 13 - 44 %    MONOCYTES 6 4.0 - 12.0 %    EOSINOPHILS 0 (L) 0.5 - 7.8 %    BASOPHILS 0 0.0 - 2.0 %    IMMATURE GRANULOCYTES 0 0.0 - 5.0 %    ABS. NEUTROPHILS 9.9 (H) 1.7 - 8.2 K/UL    ABS. LYMPHOCYTES 2.9 0.5 - 4.6 K/UL    ABS. MONOCYTES 0.8 0.1 - 1.3 K/UL    ABS. EOSINOPHILS 0.0 0.0 - 0.8 K/UL    ABS. BASOPHILS 0.1 0.0 - 0.2 K/UL    ABS. IMM. GRANS. 0.0 0.0 - 0.5 K/UL   METABOLIC PANEL, COMPREHENSIVE    Collection Time: 05/09/20  2:22 PM   Result Value Ref Range    Sodium 141 136 - 145 mmol/L    Potassium 4.0 3.5 - 5.1 mmol/L    Chloride 108 (H) 98 - 107 mmol/L    CO2 23 21 - 32 mmol/L    Anion gap 10 7 - 16 mmol/L    Glucose 111 (H) 65 - 100 mg/dL    BUN 14 8 - 23 MG/DL    Creatinine 0.74 0.6 - 1.0 MG/DL    GFR est AA >60 >60 ml/min/1.73m2    GFR est non-AA >60 >60 ml/min/1.73m2    Calcium 9.9 8.3 - 10.4 MG/DL    Bilirubin, total 0.8 0.2 - 1.1 MG/DL    ALT (SGPT) 20 12 - 65 U/L    AST (SGOT) 25 15 - 37 U/L    Alk. phosphatase 101 50 - 136 U/L    Protein, total 6.9 6.3 - 8.2 g/dL    Albumin 3.5 3.2 - 4.6 g/dL    Globulin 3.4 2.3 - 3.5 g/dL    A-G Ratio 1.0 (L) 1.2 - 3.5     LACTIC ACID    Collection Time: 05/09/20  2:22 PM   Result Value Ref Range    Lactic acid 0.9 0.4 - 2.0 MMOL/L   URINE MICROSCOPIC    Collection Time: 05/09/20  3:29 PM   Result Value Ref Range    WBC  0 /hpf    RBC 0-3 0 /hpf    Epithelial cells 10-20 0 /hpf    Bacteria 0 0 /hpf    Casts 0 0 /lpf    Crystals, urine 0 0 /LPF    Mucus 0 0 /lpf    Other observations RESULTS VERIFIED MANUALLY         Physical Exam:     General:  Alert, cooperative, no distress, pleasantly confused. Strong odor smell in room   Eyes:  Conjunctivae/corneas clear.     Ears: Normal TMs and external ear canals both ears. Nose: Nares normal. Septum midline. Mouth/Throat: Lips, mucosa, and tongue normal. Teeth and gums normal.   Neck:  no JVD. Back:   deferred   Lungs:   Clear to auscultation bilaterally. Heart:  Regular rate and rhythm, S1, S2 normal   Abdomen:   Soft, non-tender. Bowel sounds normal. Obese. Extremities: Extremities normal, atraumatic, no cyanosis or edema. Limited ROM in bed throughout all extremities. No noted deficit to one particular area. Pulses: 2+ and symmetric all extremities. Skin: Skin color, texture, turgor normal. No rashes or lesions   Lymph nodes: Cervical, supraclavicular, and axillary nodes normal.   Neurologic: Confused     Assessment and Plan     Principal Problem:    Metabolic encephalopathy (3/5/0874)    Active Problems:    CAD (coronary artery disease), native coronary artery (10/9/2010)      Morbid obesity ([address] (10/9/2010)      Essential hypertension, benign (10/9/2010)      Dyslipidemia (10/9/2010)      Diabetes mellitus ([facility] (10/9/2010)      CLAUDE (obstructive sleep apnea) (9/12/2011)      Hypertension (0/6/6567)      Metabolic encephalopathy - Suspected from UTI. UA pending. Urine culture growing Klebsiella in the past so will treat with Ceftriaxone. Urine culture ordered. Order chest x ray to ensure no pneumonia    HTN- unsure if this is true reading. Patient is large and nursing staff stating she is agitated and pulling at blood pressure cuff. Will order for manual read. PRN hydralazine. Continue Valsartan and metoprolol    COPD - Symbicort, PRN albuterol    Hypothyroidism - Levothyroxine 75mcg daily    DM type II- SS, Lantus 15 units at night and 15 units in the AM per the family    Depression - Prozac 20mg daily     SonLeisa is decision maker. [phone]. Son states she is a DNR. Daughter also agrees with DNR status    Spoken to the daughter to whom she lives with. Baseline is poor.  Daughter states she normally \"acts like a baby\" but can determine who is her family. Today, she was confused with who was in the room. Also did not answer appropriately.      DVT prophylaxis -SCDs  Signed By: Jose Acevedo DO   May 9, 2020

## 2020-05-09 NOTE — ED PROVIDER NOTES
[facility] Emergency Department  Arrival Date/Time: 5/9/2020 @ [address]  MRN: [id number]      67 y.o. female    YOB: 1940   [phone] (home)     [facility] EMERGENCY DEPT ER01/01  Seen on 5/9/2020 @ 1:00 PM      Today's Chief Complaint:   Chief Complaint   Patient presents with    Urinary Pain    Altered mental status     HPI: 52-year-old female presents to the emergency department with weakness and confusion    She knows her name. Does not remember her birthday. She knows that she is in a hospital but cannot tell me today's date. Not able to provide much in the way of historical information    No fever or vomiting is reported. No diarrhea is reported. HPI    Review of Systems: Review of Systems   Unable to perform ROS: Mental status change   Gastrointestinal: Negative for vomiting. Past Medical History: Primary Care Doctor: Dennise Hurtado MD  Meds, PMH, PSHx, SocHx at end of this note     Allergies: Allergies   Allergen Reactions    Coenzyme Q10 Rash    Niacin (Inositol Niacinate) Rash    Adhesive Tape-Silicones Rash     sts paper tape    Ambien [Zolpidem] Other (comments)     nightmares    Keflex [Cephalexin] Rash    Oxycodone Other (comments)     Slurred speech, hallucinations, droopy face, word finding issues    Pcn [Penicillins] Rash    Ramipril Other (comments)     nightmares         Key Anti-Platelet Anticoagulant Meds             aspirin delayed-release 81 mg tablet Take 1 Tab by mouth daily. Physical Exam:  Nursing documentation reviewed. Patient Vitals for the past 24 hrs:   Temp Pulse Resp BP SpO2   05/09/20 1230 97.8 °F (36.6 °C) 60 18 (!) 189/98 95 %    Vital signs were reviewed. Physical Exam  Vitals signs and nursing note reviewed. Constitutional:       Appearance: She is ill-appearing. HENT:      Head: Normocephalic. Eyes:      Pupils: Pupils are equal, round, and reactive to light.    Neck: Musculoskeletal: Normal range of motion. Cardiovascular:      Rate and Rhythm: Normal rate and regular rhythm. Heart sounds: No murmur. Pulmonary:      Effort: No respiratory distress. Breath sounds: No wheezing. Abdominal:      General: There is no distension. Palpations: Abdomen is soft. Tenderness: There is no abdominal tenderness. Musculoskeletal:         General: No swelling or tenderness. Skin:     Coloration: Skin is pale. Neurological:      Mental Status: She is alert. MEDICAL DECISION MAKING:   Differential Diagnosis:    MDM  Number of Diagnoses or Management Options  Acute encephalopathy:   Diagnosis management comments: 68 yo transported for evaluation of confusion  She is not able to provide much history    Check labs -- especially urine       Amount and/or Complexity of Data Reviewed  Clinical lab tests: ordered    Risk of Complications, Morbidity, and/or Mortality  Presenting problems: moderate  Diagnostic procedures: minimal  Management options: moderate          Data/Management:    Lab findings during this visit: No results found for this or any previous visit (from the past 48 hour(s)). Radiology studies during this visit: No results found. Medications given in the ED:   Medications   sodium chloride 0.9 % bolus infusion 1,000 mL (has no administration in time range)     Extended Emergency Contact Information  Primary Emergency Contact: [facility] Phone: [phone]  Relation: Child  Secondary Emergency Contact: FLAV[address] Phone: [phone]  Relation: Child   1:05 PM called family -- this morning family heard the patient moaning. Went in the room and patient was  The other side of the mattress from normal. C/o headache. But her speech was 'wrong'--pt usually    alert, oriented.  This morning she was difficult to understand, confused, problems answering questions  Did not know day, address, president. Will check CT and labs. Recheck and Additional Documentation:  (use .addrecheck  . addsepsis   . addstroke   . addhip  . addhandoff  . addcctime)     Elevated WBC   Normal lactate     Procedure Documentation:   Procedures     Other ED Course Notes:     ED Course as of May 10 0654   Sat May 09, 2020   1459 WBC(!): 13.6 [GH]   1512 CT reviewed by me-- no acute findings   CT HEAD WO CONT [GH]   1512 Labs delayed. .. uncertain cause. [GH]   1520 Lactic acid: 0.9 [GH]   1531 Dr. John Trevino aware    [GH]      ED Course User Index  [GH] Annabelle Krishnamurthy MD       I wore appropriate PPE throughout this patient's ED encounter. Assessment and Plan:    Impression:     ICD-10-CM ICD-9-CM    1. Acute encephalopathy G93.40 348. 30     probably due to UTI      Disposition:    Follow-up Information    None       Current Discharge Medication List          Past Medical History:      Past Medical History:   Diagnosis Date    Acute renal failure ([facility] June, 2008    Admitted with confusion and creatinine of 3.6. Improved with conservative management.  Arthritis     Asthma     CAD (coronary artery disease)     MI 1991, stents placed 2007 & 2010    COPD (ch[facility] obstructive [facility] disease) ([facility])     inhalers daily    Deep vein thrombosis of lower leg ([facility] June, 2008    Developed right leg DVT while hospitalized    Diabetes ([facility]     Type 2, on insulin, does not regularly check BG at home    Diabetes mellitus ([facility]     Dislocation of right shoulder joint     multiple    Dyspnea on exertion October, 2010    Admitted for cath and underwent PCI.  GERD (gastroesophageal reflux disease)     High cholesterol     Hypertension     managed with medications    Hypothyroid     Morbid obesity ([address]     bmi>50    Myocardial infarction ([address] 1991    Osteomyelitis of left foot ([facility] ? ??    Restless leg syndrome     Sleep apnea     uses cpap machine     Past Surgical History:   Procedure Laterality Date    CARDIAC SURG PROCEDURE UNLIST      4 stents    HX ADENOIDECTOMY      HX BREAST BIOPSY  2000    Resection of right cyst    HX CORONARY STENT PLACEMENT   &     HX HERNIA REPAIR  2006    HX KNEE REPLACEMENT  May, 2008    Right    HX ORTHOPAEDIC      Debridemnt of left foot osteomyelitits, rt shoulder replacement    HX ORTHOPAEDIC      shoulder surgery    HX TONSIL AND ADENOIDECTOMY  Childhood    HX TONSILLECTOMY      HX TUBAL LIGATION       Social History     Tobacco Use    Smoking status: Former Smoker     Types: Cigarettes     Last attempt to quit: 10/4/1983     Years since quittin.6    Smokeless tobacco: Never Used   Substance Use Topics    Alcohol use: No    Drug use: No     Prior to Admission Medications   Prescriptions Last Dose Informant Patient Reported? Taking? ACETAMINOPHEN/DIPHENHYDRAMINE (TYLENOL PM PO)   Yes No   Sig: Take  by mouth. Omega-3 Fatty Acids (FISH OIL) 300 mg cap   Yes No   Sig: Take  by mouth. albuterol (PROVENTIL, VENTOLIN) 90 mcg/Actuation inhaler   Yes No   Sig: Take 2 Puffs by inhalation every four (4) hours as needed for Shortness of Breath. ascorbic acid, vitamin C, (VITAMIN C) 1,000 mg tablet   Yes No   Sig: Take  by mouth. aspirin delayed-release 81 mg tablet   No No   Sig: Take 1 Tab by mouth daily. atorvastatin (LIPITOR) 80 mg tablet   No No   Sig: Take 1 Tab by mouth every evening. Indications: hypercholesterolemia   budesonide-formoterol (SYMBICORT) 160-4.5 mcg/Actuation HFA inhaler   Yes No   Sig: Take 2 Puffs by inhalation two (2) times a day. Take / use AM day of surgery with a sip of water per anesthesia protocols. fluoxetine (PROZAC) 20 mg Tab   Yes No   Sig: Take 20 mg by mouth daily. Take / use AM day of surgery with a sip of water per anesthesia protocols. gabapentin (NEURONTIN) 600 mg tablet   Yes No   Sig: Take  by mouth three (3) times daily.    insulin glargine (LANTUS) 100 unit/mL injection   Yes No   Si Units by SubCUTAneous route every morning. insulin glargine (LANTUS) 100 unit/mL injection   Yes No   Si Units by SubCUTAneous route nightly. insulin regular (NOVOLIN R, HUMULIN R) 100 unit/mL injection   Yes No   Sig: by SubCUTAneous route. Regular Insulin 8 units before breakfast and 8 units before lunch and 10 units at bedtime   levothyroxine (SYNTHROID) 50 mcg tablet   Yes No   Sig: Take 75 mcg by mouth Daily (before breakfast). Take / use AM day of surgery with a sip of water per anesthesia protocols. Indications: hypothyroidism   metoprolol succinate (TOPROL-XL) 100 mg tablet   No No   Sig: Take 1 Tab by mouth daily. Take / use AM day of surgery with a sip of water per anesthesia protocols. Indications: hypertension   multivitamin (ONE A DAY) tablet   Yes No   Sig: Take 1 Tab by mouth daily. nitroglycerin (NITROSTAT) 0.4 mg SL tablet   Yes No   Sig: by SubLINGual route every five (5) minutes as needed for Chest Pain.   valsartan (DIOVAN) 320 mg tablet   Yes No   Sig: Take  by mouth two (2) times a day.       Facility-Administered Medications: None

## 2020-05-10 ENCOUNTER — APPOINTMENT (OUTPATIENT)
Dept: GENERAL RADIOLOGY | Age: 80
DRG: 871 | End: 2020-05-10
Attending: INTERNAL MEDICINE
Payer: MEDICARE

## 2020-05-10 PROBLEM — R56.9 POSTICTAL STATE (HCC): Status: ACTIVE | Noted: 2020-05-10

## 2020-05-10 PROBLEM — A41.9 SEPSIS SECONDARY TO UTI (HCC): Status: ACTIVE | Noted: 2020-05-10

## 2020-05-10 PROBLEM — I10 HYPERTENSION: Chronic | Status: ACTIVE | Noted: 2020-05-09

## 2020-05-10 PROBLEM — Z66 DNR (DO NOT RESUSCITATE): Chronic | Status: ACTIVE | Noted: 2020-05-10

## 2020-05-10 PROBLEM — F03.918 DEMENTIA WITH BEHAVIORAL DISTURBANCE: Chronic | Status: ACTIVE | Noted: 2020-05-10

## 2020-05-10 PROBLEM — D32.9 MENINGIOMA (HCC): Chronic | Status: ACTIVE | Noted: 2020-05-10

## 2020-05-10 PROBLEM — F03.918 DEMENTIA WITH BEHAVIORAL DISTURBANCE: Status: ACTIVE | Noted: 2020-05-10

## 2020-05-10 PROBLEM — N39.0 SEPSIS SECONDARY TO UTI (HCC): Status: ACTIVE | Noted: 2020-05-10

## 2020-05-10 LAB
ANION GAP SERPL CALC-SCNC: 9 MMOL/L (ref 7–16)
BASOPHILS # BLD: 0 K/UL (ref 0–0.2)
BASOPHILS NFR BLD: 0 % (ref 0–2)
BUN SERPL-MCNC: 19 MG/DL (ref 8–23)
CALCIUM SERPL-MCNC: 9.8 MG/DL (ref 8.3–10.4)
CHLORIDE SERPL-SCNC: 114 MMOL/L (ref 98–107)
CO2 SERPL-SCNC: 22 MMOL/L (ref 21–32)
CREAT SERPL-MCNC: 0.93 MG/DL (ref 0.6–1)
CRP SERPL-MCNC: 0.4 MG/DL (ref 0–0.9)
DIFFERENTIAL METHOD BLD: ABNORMAL
EOSINOPHIL # BLD: 0 K/UL (ref 0–0.8)
EOSINOPHIL NFR BLD: 0 % (ref 0.5–7.8)
ERYTHROCYTE [DISTWIDTH] IN BLOOD BY AUTOMATED COUNT: 14 % (ref 11.9–14.6)
ERYTHROCYTE [SEDIMENTATION RATE] IN BLOOD: 17 MM/HR (ref 0–30)
GLUCOSE BLD STRIP.AUTO-MCNC: 121 MG/DL (ref 65–100)
GLUCOSE BLD STRIP.AUTO-MCNC: 124 MG/DL (ref 65–100)
GLUCOSE BLD STRIP.AUTO-MCNC: 130 MG/DL (ref 65–100)
GLUCOSE BLD STRIP.AUTO-MCNC: 97 MG/DL (ref 65–100)
GLUCOSE SERPL-MCNC: 153 MG/DL (ref 65–100)
HCT VFR BLD AUTO: 39.2 % (ref 35.8–46.3)
HGB BLD-MCNC: 12.9 G/DL (ref 11.7–15.4)
IMM GRANULOCYTES # BLD AUTO: 0.1 K/UL (ref 0–0.5)
IMM GRANULOCYTES NFR BLD AUTO: 0 % (ref 0–5)
LYMPHOCYTES # BLD: 2.2 K/UL (ref 0.5–4.6)
LYMPHOCYTES NFR BLD: 15 % (ref 13–44)
MAGNESIUM SERPL-MCNC: 1 MG/DL (ref 1.8–2.4)
MAGNESIUM SERPL-MCNC: 2.9 MG/DL (ref 1.8–2.4)
MCH RBC QN AUTO: 30.6 PG (ref 26.1–32.9)
MCHC RBC AUTO-ENTMCNC: 32.9 G/DL (ref 31.4–35)
MCV RBC AUTO: 92.9 FL (ref 79.6–97.8)
MONOCYTES # BLD: 0.7 K/UL (ref 0.1–1.3)
MONOCYTES NFR BLD: 5 % (ref 4–12)
NEUTS SEG # BLD: 12.2 K/UL (ref 1.7–8.2)
NEUTS SEG NFR BLD: 80 % (ref 43–78)
NRBC # BLD: 0 K/UL (ref 0–0.2)
PLATELET # BLD AUTO: 257 K/UL (ref 150–450)
PMV BLD AUTO: 11.4 FL (ref 9.4–12.3)
POTASSIUM SERPL-SCNC: 3.3 MMOL/L (ref 3.5–5.1)
PROCALCITONIN SERPL-MCNC: <0.05 NG/ML
RBC # BLD AUTO: 4.22 M/UL (ref 4.05–5.2)
SODIUM SERPL-SCNC: 145 MMOL/L (ref 136–145)
TSH SERPL DL<=0.005 MIU/L-ACNC: 1.2 UIU/ML (ref 0.36–3.74)
WBC # BLD AUTO: 15.2 K/UL (ref 4.3–11.1)

## 2020-05-10 PROCEDURE — 94760 N-INVAS EAR/PLS OXIMETRY 1: CPT

## 2020-05-10 PROCEDURE — 77010033678 HC OXYGEN DAILY

## 2020-05-10 PROCEDURE — 94660 CPAP INITIATION&MGMT: CPT

## 2020-05-10 PROCEDURE — 94640 AIRWAY INHALATION TREATMENT: CPT

## 2020-05-10 PROCEDURE — 74011000258 HC RX REV CODE- 258: Performed by: INTERNAL MEDICINE

## 2020-05-10 PROCEDURE — 74011000258 HC RX REV CODE- 258: Performed by: FAMILY MEDICINE

## 2020-05-10 PROCEDURE — 85025 COMPLETE CBC W/AUTO DIFF WBC: CPT

## 2020-05-10 PROCEDURE — 85652 RBC SED RATE AUTOMATED: CPT

## 2020-05-10 PROCEDURE — 86580 TB INTRADERMAL TEST: CPT | Performed by: INTERNAL MEDICINE

## 2020-05-10 PROCEDURE — 71045 X-RAY EXAM CHEST 1 VIEW: CPT

## 2020-05-10 PROCEDURE — 65270000029 HC RM PRIVATE

## 2020-05-10 PROCEDURE — 82962 GLUCOSE BLOOD TEST: CPT

## 2020-05-10 PROCEDURE — 84145 PROCALCITONIN (PCT): CPT

## 2020-05-10 PROCEDURE — 74011250636 HC RX REV CODE- 250/636: Performed by: FAMILY MEDICINE

## 2020-05-10 PROCEDURE — 80048 BASIC METABOLIC PNL TOTAL CA: CPT

## 2020-05-10 PROCEDURE — 83735 ASSAY OF MAGNESIUM: CPT

## 2020-05-10 PROCEDURE — 86140 C-REACTIVE PROTEIN: CPT

## 2020-05-10 PROCEDURE — 74011000302 HC RX REV CODE- 302: Performed by: INTERNAL MEDICINE

## 2020-05-10 PROCEDURE — 87635 SARS-COV-2 COVID-19 AMP PRB: CPT

## 2020-05-10 PROCEDURE — 74011000250 HC RX REV CODE- 250: Performed by: FAMILY MEDICINE

## 2020-05-10 PROCEDURE — 74011250636 HC RX REV CODE- 250/636: Performed by: INTERNAL MEDICINE

## 2020-05-10 RX ORDER — INSULIN GLARGINE 100 [IU]/ML
15 INJECTION, SOLUTION SUBCUTANEOUS
Status: DISCONTINUED | OUTPATIENT
Start: 2020-05-10 | End: 2020-05-12 | Stop reason: HOSPADM

## 2020-05-10 RX ORDER — HYDRALAZINE HYDROCHLORIDE 20 MG/ML
20 INJECTION INTRAMUSCULAR; INTRAVENOUS
Status: DISCONTINUED | OUTPATIENT
Start: 2020-05-10 | End: 2020-05-12 | Stop reason: HOSPADM

## 2020-05-10 RX ORDER — LORAZEPAM 2 MG/ML
0.5 INJECTION INTRAMUSCULAR
Status: DISCONTINUED | OUTPATIENT
Start: 2020-05-10 | End: 2020-05-12 | Stop reason: HOSPADM

## 2020-05-10 RX ORDER — INSULIN LISPRO 100 [IU]/ML
INJECTION, SOLUTION INTRAVENOUS; SUBCUTANEOUS
Status: DISCONTINUED | OUTPATIENT
Start: 2020-05-10 | End: 2020-05-12 | Stop reason: HOSPADM

## 2020-05-10 RX ORDER — HYDRALAZINE HYDROCHLORIDE 20 MG/ML
10 INJECTION INTRAMUSCULAR; INTRAVENOUS
Status: DISCONTINUED | OUTPATIENT
Start: 2020-05-10 | End: 2020-05-12 | Stop reason: HOSPADM

## 2020-05-10 RX ORDER — POTASSIUM CHLORIDE 14.9 MG/ML
20 INJECTION INTRAVENOUS
Status: COMPLETED | OUTPATIENT
Start: 2020-05-10 | End: 2020-05-10

## 2020-05-10 RX ORDER — MAGNESIUM SULFATE HEPTAHYDRATE 40 MG/ML
2 INJECTION, SOLUTION INTRAVENOUS
Status: COMPLETED | OUTPATIENT
Start: 2020-05-10 | End: 2020-05-10

## 2020-05-10 RX ADMIN — TUBERCULIN PURIFIED PROTEIN DERIVATIVE 5 UNITS: 5 INJECTION INTRADERMAL at 13:00

## 2020-05-10 RX ADMIN — LEVETIRACETAM 500 MG: 500 INJECTION, SOLUTION INTRAVENOUS at 23:49

## 2020-05-10 RX ADMIN — ALBUTEROL SULFATE 2.5 MG: 2.5 SOLUTION RESPIRATORY (INHALATION) at 07:29

## 2020-05-10 RX ADMIN — VANCOMYCIN HYDROCHLORIDE 1500 MG: 10 INJECTION, POWDER, LYOPHILIZED, FOR SOLUTION INTRAVENOUS at 13:46

## 2020-05-10 RX ADMIN — METRONIDAZOLE 500 MG: 500 INJECTION, SOLUTION INTRAVENOUS at 03:30

## 2020-05-10 RX ADMIN — BUDESONIDE 500 MCG: 0.5 INHALANT RESPIRATORY (INHALATION) at 19:34

## 2020-05-10 RX ADMIN — MAGNESIUM SULFATE IN WATER 2 G: 40 INJECTION, SOLUTION INTRAVENOUS at 03:31

## 2020-05-10 RX ADMIN — MAGNESIUM SULFATE IN WATER 2 G: 40 INJECTION, SOLUTION INTRAVENOUS at 01:07

## 2020-05-10 RX ADMIN — ALBUTEROL SULFATE 2.5 MG: 2.5 SOLUTION RESPIRATORY (INHALATION) at 19:34

## 2020-05-10 RX ADMIN — POTASSIUM CHLORIDE 20 MEQ: 200 INJECTION, SOLUTION INTRAVENOUS at 12:44

## 2020-05-10 RX ADMIN — LEVETIRACETAM 500 MG: 500 INJECTION, SOLUTION INTRAVENOUS at 12:49

## 2020-05-10 RX ADMIN — POTASSIUM CHLORIDE 20 MEQ: 200 INJECTION, SOLUTION INTRAVENOUS at 15:00

## 2020-05-10 RX ADMIN — ALBUTEROL SULFATE 2.5 MG: 2.5 SOLUTION RESPIRATORY (INHALATION) at 14:59

## 2020-05-10 RX ADMIN — MAGNESIUM SULFATE IN WATER 2 G: 40 INJECTION, SOLUTION INTRAVENOUS at 05:59

## 2020-05-10 RX ADMIN — CEFTRIAXONE 1 G: 1 INJECTION, POWDER, FOR SOLUTION INTRAMUSCULAR; INTRAVENOUS at 15:36

## 2020-05-10 RX ADMIN — BUDESONIDE 500 MCG: 0.5 INHALANT RESPIRATORY (INHALATION) at 07:29

## 2020-05-10 RX ADMIN — MAGNESIUM SULFATE IN WATER 2 G: 40 INJECTION, SOLUTION INTRAVENOUS at 09:49

## 2020-05-10 RX ADMIN — CEFTRIAXONE SODIUM 2 G: 2 INJECTION, POWDER, FOR SOLUTION INTRAMUSCULAR; INTRAVENOUS at 03:30

## 2020-05-10 RX ADMIN — METRONIDAZOLE 500 MG: 500 INJECTION, SOLUTION INTRAVENOUS at 11:28

## 2020-05-10 NOTE — PROGRESS NOTES
Contacted Dr. Jernigan  r/t pt being unable to swallow PO medications or take PO intake as pt is lethargic and only responding to painful stimulus. Will hold all PO mediations and insulin at this time. Dr. Jernigan  to assess.

## 2020-05-10 NOTE — PROGRESS NOTES
[facility] CRITICAL CARE OUTREACH NURSE PROGRESS REPORT      SUBJECTIVE: Called to assess patient secondary to rapid response 05/09. MEWS Score: 4 (05/10/20 0106)  Vitals:    05/09/20 2027 05/09/20 2120 05/09/20 2244 05/10/20 0106   BP:  (!) 174/114  146/84   Pulse:  67  (!) 105   Resp:  20  20   Temp:  98.7 °F (37.1 °C)  100 °F (37.8 °C)   SpO2:  97% 96% 97%   Weight: 96.7 kg (213 lb 3 oz)           LAB DATA:    Recent Labs     05/09/20  2358 05/09/20  1422   NA  --  141   K  --  4.0   CL  --  108*   CO2  --  23   AGAP  --  10   GLU  --  111*   BUN  --  14   CREA  --  0.74   GFRAA  --  >60   GFRNA  --  >60   CA  --  9.9   MG 1.0*  --    ALB  --  3.5   TP  --  6.9   GLOB  --  3.4   AGRAT  --  1.0*   ALT  --  20        Recent Labs     05/09/20  1422   WBC 13.6*   HGB 14.2   HCT 43.5             OBJECTIVE: On arrival to room, I found patient to be in bed, nursing staff and respiratory at bedside. ASSESSMENT:  Per primary RN, rapid response called when pt was found with decreased LOC and what appeared to be uncontrolled jerking in arms and hands. Upon my arrival to the room, VSS, BG WNL. Pt now following simple commands, LOC appears to be improving without intervention. Hospitalist arrived at bedside to assess pt. Orders placed for IV keppra and PRN ativan. PLAN:  MRI per Hospitalist order. Will continue to follow per outreach protocol.

## 2020-05-10 NOTE — PROGRESS NOTES
Pharmacokinetic Consult to Pharmacist    Belkis Brockjanki is a 67 y.o. female being treated for possible meningitis with vancomycin. Height: 5' 1\" Weight: 96.7 kg (213 lb 3 oz)  Lab Results   Component Value Date/Time    BUN 14 05/09/2020 02:22 PM    Creatinine 0.74 05/09/2020 02:22 PM    WBC 13.6 (H) 05/09/2020 02:22 PM    Procalcitonin <0.05 05/09/2020 02:22 PM    Lactic acid 0.9 05/09/2020 02:22 PM      Estimated Creatinine Clearance: 66.7 mL/min (based on SCr of 0.74 mg/dL). CULTURES:  5/9 BCx: pending   UCx: pending    Day 1 of vancomycin. Goal trough is 15 -20. Vancomycin dose initiated at 2500mg load x1 (SFE + SFD), then 1.5g q 18 hours. Will continue to follow patient and order levels when clinically indicated.     Thank you,  Lisa Lewis, PharmD  Clinical Pharmacist  [phone]

## 2020-05-10 NOTE — PROGRESS NOTES
Hourly rounding completed on this shift. No new complaints at this time. All needs met. Pt is currently resting in bed. She has been lethargic this shift with episodes of alertness . Will continue to monitor and give report to oncoming nurse.

## 2020-05-10 NOTE — PROGRESS NOTES
On rounding this am pt appears to have dyspnea and pt asleep. Pt only responding minimally to painful stimulus but not staying alert. Pt had 4 mg Ativan overnight after Rapid Response called. Pt normally wears CPAP at home. Respiratory evaluated pt and agrees CPAP needed. Contacted Dr. Daniella Rahman and new order received for CPAP. Respiratory applied.

## 2020-05-10 NOTE — PROGRESS NOTES
Hospitalist Note     Admit Date:  2020  5:35 PM   Name:  Zach Sorto   Age:  67 y.o.  :  1940   MRN:  [id number]   PCP:  Chapin Bennett MD  Treatment Team: Attending Provider: Toya Valentin MD; Charge Nurse: Nya Swartz    HPI/Subjective:   Pt is a 68 y/o F with dementia, \"poor\" baseline status per family, who presented with worsened confusion, less responsive. Had tachycardia, elevated WBC on admit with low grade temp 100. Possible seizure activity observed after admission. Started on rocephin initially, then expanded to vanc/rocephin. 5/10 - pt unresponsive still, on CPAP. No overt distress otherwise. Chronically ill appearing. Objective:     Patient Vitals for the past 24 hrs:   Temp Pulse Resp BP SpO2   05/10/20 1152 98.5 °F (36.9 °C) 62 21 124/55 94 %   05/10/20 0801     94 %   05/10/20 0735 98.7 °F (37.1 °C) 87 21 132/82 92 %   05/10/20 0733     93 %   05/10/20 0500 99.5 °F (37.5 °C) 93 20 158/67 94 %   05/10/20 0106 100 °F (37.8 °C) (!) 105 20 146/84 97 %   20 2244     96 %   20 2120 98.7 °F (37.1 °C) 67 20 (!) 174/114 97 %   20 1945 99.1 °F (37.3 °C)       20 1941    163/80    20 1939  73  (!) 176/102 98 %   20 1748 98.1 °F (36.7 °C) (!) 59 18 185/79 98 %     Oxygen Therapy  O2 Sat (%): 94 % (05/10/20 1152)  Pulse via Oximetry: 80 beats per minute (05/10/20 08)  O2 Device: CPAP mask (05/10/20 0801)  O2 Flow Rate (L/min): 2 l/min (05/10/20 08)    Estimated body mass index is 40.28 kg/m² as calculated from the following:    Height as of 20: 5' 1\" (1.549 m). Weight as of this encounter: 96.7 kg (213 lb 3 oz).       Intake/Output Summary (Last 24 hours) at 5/10/2020 1433  Last data filed at 2020 2120  Gross per 24 hour   Intake    Output 1 ml   Net -1 ml       *Note that automatically entered I/Os may not be accurate; dependent on patient compliance with collection and accurate data entry by [facility]. General:     Chronically ill appearing. obese  CV:   RRR. No murmur, rub, or gallop. Lungs:   Diminished anteriorly. No wheezing, rhonchi, or rales. Abdomen:   Soft, nontender, nondistended. Extremities: Warm and dry. No cyanosis or edema. Skin:     No rashes or jaundice. Neuro:  No gross focal deficits    Data Review:  I have reviewed all labs, meds, and studies from the last 24 hours:    Recent Results (from the past 24 hour(s))   URINE MICROSCOPIC    Collection Time: 05/09/20  3:29 PM   Result Value Ref Range    WBC  0 /hpf    RBC 0-3 0 /hpf    Epithelial cells 10-20 0 /hpf    Bacteria 0 0 /hpf    Casts 0 0 /lpf    Crystals, urine 0 0 /LPF    Mucus 0 0 /lpf    Other observations RESULTS VERIFIED MANUALLY     CULTURE, URINE    Collection Time: 05/09/20  3:29 PM   Result Value Ref Range    Special Requests: NO SPECIAL REQUESTS      Culture result:        NO GROWTH AFTER SHORT PERIOD OF INCUBATION. FURTHER RESULTS TO FOLLOW AFTER OVERNIGHT INCUBATION.    CULTURE, BLOOD    Collection Time: 05/09/20  3:42 PM   Result Value Ref Range    Special Requests: LEFT  HAND        Culture result: NO GROWTH AFTER 16 HOURS     CK    Collection Time: 05/09/20  8:09 PM   Result Value Ref Range    CK 70 21 - 215 U/L   GLUCOSE, POC    Collection Time: 05/09/20  9:27 PM   Result Value Ref Range    Glucose (POC) 203 (H) 65 - 100 mg/dL   MAGNESIUM    Collection Time: 05/09/20 11:58 PM   Result Value Ref Range    Magnesium 1.0 (LL) 1.8 - 2.4 mg/dL   TSH 3RD GENERATION    Collection Time: 05/09/20 11:58 PM   Result Value Ref Range    TSH 1.200 0.358 - 3.740 uIU/mL   CBC WITH AUTOMATED DIFF    Collection Time: 05/10/20  6:05 AM   Result Value Ref Range    WBC 15.2 (H) 4.3 - 11.1 K/uL    RBC 4.22 4.05 - 5.2 M/uL    HGB 12.9 11.7 - 15.4 g/dL    HCT 39.2 35.8 - 46.3 %    MCV 92.9 79.6 - 97.8 FL    MCH 30.6 26.1 - 32.9 PG    MCHC 32.9 31.4 - 35.0 g/dL    RDW 14.0 11.9 - 14.6 %    PLATELET 148 795 - 061 K/uL MPV 11.4 9.4 - 12.3 FL    ABSOLUTE NRBC 0.00 0.0 - 0.2 K/uL    DF AUTOMATED      NEUTROPHILS 80 (H) 43 - 78 %    LYMPHOCYTES 15 13 - 44 %    MONOCYTES 5 4.0 - 12.0 %    EOSINOPHILS 0 (L) 0.5 - 7.8 %    BASOPHILS 0 0.0 - 2.0 %    IMMATURE GRANULOCYTES 0 0.0 - 5.0 %    ABS. NEUTROPHILS 12.2 (H) 1.7 - 8.2 K/UL    ABS. LYMPHOCYTES 2.2 0.5 - 4.6 K/UL    ABS. MONOCYTES 0.7 0.1 - 1.3 K/UL    ABS. EOSINOPHILS 0.0 0.0 - 0.8 K/UL    ABS. BASOPHILS 0.0 0.0 - 0.2 K/UL    ABS. IMM.  GRANS. 0.1 0.0 - 0.5 K/UL   METABOLIC PANEL, BASIC    Collection Time: 05/10/20  6:05 AM   Result Value Ref Range    Sodium 145 136 - 145 mmol/L    Potassium 3.3 (L) 3.5 - 5.1 mmol/L    Chloride 114 (H) 98 - 107 mmol/L    CO2 22 21 - 32 mmol/L    Anion gap 9 7 - 16 mmol/L    Glucose 153 (H) 65 - 100 mg/dL    BUN 19 8 - 23 MG/DL    Creatinine 0.93 0.6 - 1.0 MG/DL    GFR est AA >60 >60 ml/min/1.73m2    GFR est non-AA >60 >60 ml/min/1.73m2    Calcium 9.8 8.3 - 10.4 MG/DL   C REACTIVE PROTEIN, QT    Collection Time: 05/10/20  6:05 AM   Result Value Ref Range    C-Reactive protein 0.4 0.0 - 0.9 mg/dL   MAGNESIUM    Collection Time: 05/10/20  6:05 AM   Result Value Ref Range    Magnesium 2.9 (H) 1.8 - 2.4 mg/dL   PROCALCITONIN    Collection Time: 05/10/20  6:05 AM   Result Value Ref Range    Procalcitonin <0.05 ng/mL   GLUCOSE, POC    Collection Time: 05/10/20  7:02 AM   Result Value Ref Range    Glucose (POC) 130 (H) 65 - 100 mg/dL   GLUCOSE, POC    Collection Time: 05/10/20 11:16 AM   Result Value Ref Range    Glucose (POC) 121 (H) 65 - 100 mg/dL        All Micro Results     None          Current Meds:  Current Facility-Administered Medications   Medication Dose Route Frequency    levETIRAcetam (KEPPRA) 500 mg in 0.9% sodium chloride (MBP/ADV) 100 mL  500 mg IntraVENous Q12H    cefTRIAXone (ROCEPHIN) 1 g in 0.9% sodium chloride (MBP/ADV) 50 mL  1 g IntraVENous Q12H    LORazepam (ATIVAN) injection 0.5 mg  0.5 mg IntraVENous Q2H PRN    potassium chloride 20 mEq in 100 ml IVPB  20 mEq IntraVENous Q2H    hydrALAZINE (APRESOLINE) 20 mg/mL injection 20 mg  20 mg IntraVENous Q4H PRN    hydrALAZINE (APRESOLINE) 20 mg/mL injection 10 mg  10 mg IntraVENous Q4H PRN    insulin glargine (LANTUS) injection 15 Units  15 Units SubCUTAneous QHS    insulin lispro (HUMALOG) injection   SubCUTAneous AC&HS    tuberculin injection 5 Units  5 Units IntraDERMal ONCE    acetaminophen (TYLENOL) tablet 650 mg  650 mg Oral Q4H PRN    naloxone (NARCAN) injection 0.4 mg  0.4 mg IntraVENous PRN    diphenhydrAMINE (BENADRYL) capsule 25 mg  25 mg Oral Q4H PRN    ondansetron (ZOFRAN) injection 4 mg  4 mg IntraVENous Q4H PRN    senna-docusate (PERICOLACE) 8.6-50 mg per tablet 2 Tab  2 Tab Oral DAILY PRN    valsartan (DIOVAN) tablet 320 mg  320 mg Oral DAILY    metoprolol succinate (TOPROL-XL) tablet 100 mg  100 mg Oral DAILY    atorvastatin (LIPITOR) tablet 80 mg  80 mg Oral QHS    gabapentin (NEURONTIN) capsule 300 mg  300 mg Oral TID    aspirin chewable tablet 81 mg  81 mg Oral DAILY    levothyroxine (SYNTHROID) tablet 75 mcg  75 mcg Oral 6am    albuterol (PROVENTIL VENTOLIN) nebulizer solution 2.5 mg  2.5 mg Nebulization Q6H PRN    FLUoxetine (PROzac) capsule 20 mg  20 mg Oral DAILY    budesonide (PULMICORT) 500 mcg/2 ml nebulizer suspension  500 mcg Nebulization BID RT    And    albuterol (PROVENTIL VENTOLIN) nebulizer solution 2.5 mg  2.5 mg Nebulization Q6HWA RT       Other Studies:  No results found for this visit on 05/09/20. Xr Chest Sngl V    Result Date: 5/10/2020  Chest portable CLINICAL INDICATION: Follow-up of pneumonia, subacute dyspnea, metabolic encephalopathy, chronic dyspnea on exertion COMPARISON: 1/29/2019 TECHNIQUE: single AP portable view chest at 12:10 PM semiupright FINDINGS: Patient is rotated to the left. Lung volumes are well inflated. Mediastinal and hilar contours are stable.  No evidence of consolidation, pneumothorax, pleural effusion or CHF. Coronary artery stent material again noted. Bone density is low throughout. Right shoulder hardware is partially seen. IMPRESSION: Cardiomegaly. No consolidation. Mri Brain W Wo Cont    Result Date: 5/9/2020  EXAM: MRI of the brain with and without IV contrast. INDICATION: Altered mental status, seizure. COMPARISON: Prior MRI brain on September 17, 2011. TECHNIQUE: Multiecho, multiplanar pre and postcontrast MRI images of the brain were obtained. 20 mL of Dotarem was injected intravenously. FINDINGS: There is moderate volume loss with mild chronic small vessel ischemic changes in the white matter, which have mildly progressed from the prior study. No acute infarct, hemorrhage or evidence of hydrocephalus is seen. There is a 7 mm enhancing dural based lesion along the anterior margin of the right frontal lobe, in retrospect unchanged from the prior study, with the appearance of a small meningioma. It abuts the underlying brain, without causing mass effect or parenchymal edema. The posterior fossa structures are unremarkable. The orbital structures are within normal limits. Again noted is mucosal thickening in the paranasal sinuses. IMPRESSION: 1. No acute intracranial process. 2. Cerebral atrophy and chronic white matter ischemic changes, mildly progressed from the prior study in 2011. 3. Unchanged 7 mm right frontal meningioma. Ct Head Wo Cont    Result Date: 5/9/2020  Noncontrast head CT Clinical Indication: Acute altered mental status, weakness, diminished responsiveness, leukocytosis. History of sinusitis. Technique: Noncontrast axial images were obtained through the brain. All CT scans at this location are performed using dose modulation techniques as appropriate including the following: Automated exposure control, adjustment of the MA and/or kV according to patient's size, or use of iterative reconstruction technique.  Comparison: 3/13/2019 CT at 9/17/2011 MRI Findings: Some detail is limited by patient motion. Technologist reports best possible imaging at this time. There is no acute intracranial hemorrhage, hydrocephalus, intra-axial mass, or mass-effect. Chronic scattered hypodensities are again seen and most compatible with chronic small vessel ischemic change. Small benign dilated perivascular space again noted in right basal ganglia. There is no CT evidence of acute large artery territorial infarction or abnormal extra-axial fluid collection. The mastoid air cells are well aerated. Paranasal sinuses are notable for chronic partial opacification of left maxillary sinus, and trace layering fluid in the right sphenoid sinus that could indicate acute infection. No displaced skull fractures are present. Impression: No CT evidence of acute intracranial abnormality. Paranasal sinus disease.          Assessment and Plan:     Hospital Problems as of 5/10/2020 Date Reviewed: 2/16/2018          Codes Class Noted - Resolved POA    Meningioma ([facility] (Chronic) ICD-10-CM: D32.9  ICD-9-CM: 225.2  5/10/2020 - Present Yes        * (Principal) Sepsis ([facility] ICD-10-CM: A41.9  ICD-9-CM: 038.9, 995.91  5/10/2020 - Present Yes        Dementia with behavioral disturbance (HCC) (Chronic) ICD-10-CM: F03.91  ICD-9-CM: 294.21  5/10/2020 - Present Yes        DNR (do not resuscitate) (Chronic) ICD-10-CM: Z66  ICD-9-CM: V49.86  5/10/2020 - Present Yes        Postictal state ([facility] ICD-10-CM: R56.9  ICD-9-CM: 780.39  5/10/2020 - Present Yes        Acute metabolic encephalopathy GQU-64-CI: G93.41  ICD-9-CM: 348.31  5/9/2020 - Present Yes        Hypertension (Chronic) ICD-10-CM: I10  ICD-9-CM: 401.9  5/9/2020 - Present Yes        CLAUDE (obstructive sleep apnea) (Chronic) ICD-10-CM: G47.33  ICD-9-CM: 327.23  9/12/2011 - Present Yes        Seizures ([facility] ICD-10-CM: R56.9  ICD-9-CM: 780.39  9/10/2011 - Present Yes        CAD (coronary artery disease), native coronary artery (Chronic) ICD-10-CM: I25.10  ICD-9-CM: 414.01 10/9/2010 - Present Yes        Morbid obesity ([facility] (Chronic) ICD-10-CM: E66.01  ICD-9-CM: 278.01  10/9/2010 - Present Yes        Essential hypertension, benign (Chronic) ICD-10-CM: I10  ICD-9-CM: 401.1  10/9/2010 - Present Yes        Dyslipidemia (Chronic) ICD-10-CM: E78.5  ICD-9-CM: 272.4  10/9/2010 - Present Yes        Diabetes mellitus ([facility] (Chronic) ICD-10-CM: E11.9  ICD-9-CM: 250.00  10/9/2010 - Present Yes              Plan:  Encephalopathy  -multifactorial  -treat underlying illnesses  -also has some chronic white matter changes, progressed on MRI. Has underlying dementia per admit note   -if she truly has poor baseline status as per admit note, may be good to discuss hospice with family  -use CPAP while unresponsive or sleeping due to CLAUDE    Possible Seizure  -MRI negative except for known meningioma  -resume home keppra, IV  -PRN ativan but reduce dose, start low and may give additional doses if needed  -I do not think stat EEG is going to  so will not order.  -can hold PO meds if too somnolent    Possible Sepsis  -low grade temp but unclear if any infection  -CXR neg. CRP neg. PCT neg x2. Lactate neg. UA with seemingly sterile pyuria. -ESR pending  -doubt infection given above. Stop vanc/flagyl. Will cont rocephin for now as UTIs may not cause elevation in ACPs the way other conditions would.   If urine cx neg, will stop tomorrow    HTN  -cont current meds  -IV hydralazine PRN if cannot take PO    FEN  -replace K  -recheck Mg after replacement    DC planning/Dispo:    -PPD/PT/OT ordered    Diet:  DIET DIABETIC CONSISTENT CARB  DVT ppx:  SCDs    Signed:  Lyly Hoffmann MD

## 2020-05-10 NOTE — PROGRESS NOTES
Rapid response called for seizure like activity this shift. Pt taken to MRI. Mg critical 1.0 called, MD notified, orders received. Pt responding to pain only. Hourly and PRN rounds performed during this shift; all needs met at this time.

## 2020-05-10 NOTE — PROGRESS NOTES
Dr. Shubham Salazar contacted this nurse and ordered nasal swab for COVID rule out for possible rehab placement.

## 2020-05-11 LAB
ANION GAP SERPL CALC-SCNC: 7 MMOL/L (ref 7–16)
BASOPHILS # BLD: 0.1 K/UL (ref 0–0.2)
BASOPHILS NFR BLD: 1 % (ref 0–2)
BUN SERPL-MCNC: 22 MG/DL (ref 8–23)
CALCIUM SERPL-MCNC: 10 MG/DL (ref 8.3–10.4)
CHLORIDE SERPL-SCNC: 117 MMOL/L (ref 98–107)
CO2 SERPL-SCNC: 23 MMOL/L (ref 21–32)
CREAT SERPL-MCNC: 0.88 MG/DL (ref 0.6–1)
DIFFERENTIAL METHOD BLD: ABNORMAL
EOSINOPHIL # BLD: 0.1 K/UL (ref 0–0.8)
EOSINOPHIL NFR BLD: 1 % (ref 0.5–7.8)
ERYTHROCYTE [DISTWIDTH] IN BLOOD BY AUTOMATED COUNT: 14.4 % (ref 11.9–14.6)
GLUCOSE BLD STRIP.AUTO-MCNC: 128 MG/DL (ref 65–100)
GLUCOSE BLD STRIP.AUTO-MCNC: 133 MG/DL (ref 65–100)
GLUCOSE BLD STRIP.AUTO-MCNC: 184 MG/DL (ref 65–100)
GLUCOSE BLD STRIP.AUTO-MCNC: 88 MG/DL (ref 65–100)
GLUCOSE SERPL-MCNC: 114 MG/DL (ref 65–100)
HCT VFR BLD AUTO: 39.5 % (ref 35.8–46.3)
HGB BLD-MCNC: 12.3 G/DL (ref 11.7–15.4)
IMM GRANULOCYTES # BLD AUTO: 0 K/UL (ref 0–0.5)
IMM GRANULOCYTES NFR BLD AUTO: 0 % (ref 0–5)
LYMPHOCYTES # BLD: 2.3 K/UL (ref 0.5–4.6)
LYMPHOCYTES NFR BLD: 21 % (ref 13–44)
MAGNESIUM SERPL-MCNC: 2.7 MG/DL (ref 1.8–2.4)
MCH RBC QN AUTO: 29.9 PG (ref 26.1–32.9)
MCHC RBC AUTO-ENTMCNC: 31.1 G/DL (ref 31.4–35)
MCV RBC AUTO: 96.1 FL (ref 79.6–97.8)
MM INDURATION POC: 0 MM (ref 0–5)
MONOCYTES # BLD: 0.9 K/UL (ref 0.1–1.3)
MONOCYTES NFR BLD: 8 % (ref 4–12)
NEUTS SEG # BLD: 7.6 K/UL (ref 1.7–8.2)
NEUTS SEG NFR BLD: 69 % (ref 43–78)
NRBC # BLD: 0 K/UL (ref 0–0.2)
PLATELET # BLD AUTO: 206 K/UL (ref 150–450)
PMV BLD AUTO: 11.4 FL (ref 9.4–12.3)
POTASSIUM SERPL-SCNC: 3.7 MMOL/L (ref 3.5–5.1)
PPD POC: NEGATIVE NEGATIVE
RBC # BLD AUTO: 4.11 M/UL (ref 4.05–5.2)
SODIUM SERPL-SCNC: 147 MMOL/L (ref 136–145)
WBC # BLD AUTO: 11 K/UL (ref 4.3–11.1)

## 2020-05-11 PROCEDURE — 97166 OT EVAL MOD COMPLEX 45 MIN: CPT

## 2020-05-11 PROCEDURE — 97530 THERAPEUTIC ACTIVITIES: CPT

## 2020-05-11 PROCEDURE — 80048 BASIC METABOLIC PNL TOTAL CA: CPT

## 2020-05-11 PROCEDURE — 95812 EEG 41-60 MINUTES: CPT | Performed by: PSYCHIATRY & NEUROLOGY

## 2020-05-11 PROCEDURE — 97161 PT EVAL LOW COMPLEX 20 MIN: CPT

## 2020-05-11 PROCEDURE — 74011636637 HC RX REV CODE- 636/637: Performed by: INTERNAL MEDICINE

## 2020-05-11 PROCEDURE — 83735 ASSAY OF MAGNESIUM: CPT

## 2020-05-11 PROCEDURE — 74011000258 HC RX REV CODE- 258: Performed by: INTERNAL MEDICINE

## 2020-05-11 PROCEDURE — 85025 COMPLETE CBC W/AUTO DIFF WBC: CPT

## 2020-05-11 PROCEDURE — 74011250637 HC RX REV CODE- 250/637: Performed by: INTERNAL MEDICINE

## 2020-05-11 PROCEDURE — 36415 COLL VENOUS BLD VENIPUNCTURE: CPT

## 2020-05-11 PROCEDURE — 94640 AIRWAY INHALATION TREATMENT: CPT

## 2020-05-11 PROCEDURE — 65270000029 HC RM PRIVATE

## 2020-05-11 PROCEDURE — 82962 GLUCOSE BLOOD TEST: CPT

## 2020-05-11 PROCEDURE — 74011250636 HC RX REV CODE- 250/636: Performed by: INTERNAL MEDICINE

## 2020-05-11 PROCEDURE — 74011000250 HC RX REV CODE- 250: Performed by: FAMILY MEDICINE

## 2020-05-11 PROCEDURE — 74011250637 HC RX REV CODE- 250/637: Performed by: FAMILY MEDICINE

## 2020-05-11 RX ORDER — LOSARTAN POTASSIUM 50 MG/1
TABLET ORAL DAILY
COMMUNITY
End: 2020-05-12

## 2020-05-11 RX ORDER — DONEPEZIL HYDROCHLORIDE 5 MG/1
5 TABLET, FILM COATED ORAL
COMMUNITY

## 2020-05-11 RX ORDER — LEVETIRACETAM 500 MG/1
500 TABLET ORAL 2 TIMES DAILY
Status: DISCONTINUED | OUTPATIENT
Start: 2020-05-11 | End: 2020-05-12 | Stop reason: HOSPADM

## 2020-05-11 RX ORDER — SAME BUTANEDISULFONATE/BETAINE 400-600 MG
250 POWDER IN PACKET (EA) ORAL DAILY
COMMUNITY

## 2020-05-11 RX ORDER — COLESEVELAM 180 1/1
1875 TABLET ORAL
COMMUNITY

## 2020-05-11 RX ADMIN — ASPIRIN 81 MG 81 MG: 81 TABLET ORAL at 08:30

## 2020-05-11 RX ADMIN — CEFTRIAXONE 1 G: 1 INJECTION, POWDER, FOR SOLUTION INTRAMUSCULAR; INTRAVENOUS at 17:34

## 2020-05-11 RX ADMIN — CEFTRIAXONE 1 G: 1 INJECTION, POWDER, FOR SOLUTION INTRAMUSCULAR; INTRAVENOUS at 05:09

## 2020-05-11 RX ADMIN — INSULIN GLARGINE 15 UNITS: 100 INJECTION, SOLUTION SUBCUTANEOUS at 22:54

## 2020-05-11 RX ADMIN — ALBUTEROL SULFATE 2.5 MG: 2.5 SOLUTION RESPIRATORY (INHALATION) at 19:41

## 2020-05-11 RX ADMIN — GABAPENTIN 300 MG: 300 CAPSULE ORAL at 08:30

## 2020-05-11 RX ADMIN — METOPROLOL SUCCINATE 100 MG: 100 TABLET, EXTENDED RELEASE ORAL at 08:30

## 2020-05-11 RX ADMIN — ALBUTEROL SULFATE 2.5 MG: 2.5 SOLUTION RESPIRATORY (INHALATION) at 07:21

## 2020-05-11 RX ADMIN — VALSARTAN 320 MG: 80 TABLET ORAL at 08:28

## 2020-05-11 RX ADMIN — LEVETIRACETAM 500 MG: 500 TABLET ORAL at 12:09

## 2020-05-11 RX ADMIN — LEVETIRACETAM 500 MG: 500 TABLET ORAL at 17:34

## 2020-05-11 RX ADMIN — INSULIN LISPRO 2 UNITS: 100 INJECTION, SOLUTION INTRAVENOUS; SUBCUTANEOUS at 22:54

## 2020-05-11 RX ADMIN — FLUOXETINE 20 MG: 20 CAPSULE ORAL at 08:30

## 2020-05-11 RX ADMIN — BUDESONIDE 500 MCG: 0.5 INHALANT RESPIRATORY (INHALATION) at 07:21

## 2020-05-11 RX ADMIN — GABAPENTIN 300 MG: 300 CAPSULE ORAL at 22:54

## 2020-05-11 RX ADMIN — ATORVASTATIN CALCIUM 80 MG: 80 TABLET, FILM COATED ORAL at 22:54

## 2020-05-11 RX ADMIN — GABAPENTIN 300 MG: 300 CAPSULE ORAL at 17:34

## 2020-05-11 RX ADMIN — BUDESONIDE 500 MCG: 0.5 INHALANT RESPIRATORY (INHALATION) at 19:41

## 2020-05-11 RX ADMIN — LEVOTHYROXINE SODIUM 75 MCG: 0.07 TABLET ORAL at 05:09

## 2020-05-11 NOTE — PROGRESS NOTES
Problem: Mobility Impaired (Adult and Pediatric)  Goal: *Acute Goals and Plan of Care (Insert Text)  Description: LTG:  (1.)Ms. Keara Harper will move from supine to sit and sit to supine , scoot up and down and roll side to side in bed with MINIMAL ASSIST within 7 treatment day(s). (2.)Ms. Keara Harper will transfer from bed to chair and chair to bed with MODERATE ASSIST using the least restrictive device within 7 treatment day(s). (3.)Ms. Keara Harper will ambulate with MODERATE ASSIST for 20 feet with the least restrictive device within 7 treatment day(s). (4.)Ms. Keara Harper will participate in therapeutic activity/exercises x 23 minutes for increased strength within 7 treatment days. (5.)Ms. Keara Harper will maintain static/dynamic sitting x 15 minutes with SUPERVISIOB for improved balance within 7 days. ________________________________________________________________________________________________     Outcome: Progressing Towards Goal     PHYSICAL THERAPY: Initial Assessment and PM 5/11/2020  INPATIENT: PT Visit Days : 1  [place] / [place] / Product Type: Medicare /       NAME/AGE/GENDER: Tosha Jacob is a 67 y.o. female   PRIMARY DIAGNOSIS: Metabolic encephalopathy [B37.52] Sepsis ([facility] Sepsis ([facility]        ICD-10: Treatment Diagnosis:    Generalized Muscle Weakness (M62.81)  Difficulty in walking, Not elsewhere classified (R26.2)   Precaution/Allergies:  Coenzyme q10; Niacin (inositol niacinate); Adhesive tape-silicones; Ambien [zolpidem]; Oxycodone; Pcn [penicillins]; and Ramipril      ASSESSMENT:     Ms. Keara Harper is a 67 y.o. female in the hospital for the above who was supine in bed upon arrival.  Ms. Keara Harper presents to PT with decreased AROM and grossly decreased strength in B LEs. Pt was quite confused today and struggled with command following. Pt required mod-max assist to come to sitting on EOB with fair to poor sitting balance.   She also has decreased safety awareness and several times tried to lay back down unsafely. Pt assisted back to bed after performing EOB activities with [place]. Ms. Pamela Moy could benefit from skilled PT as she has balance and strength deficits. This section established at most recent assessment   PROBLEM LIST (Impairments causing functional limitations):  Decreased Strength  Decreased ADL/Functional Activities  Decreased Transfer Abilities  Decreased Ambulation Ability/Technique  Decreased Balance  Decreased Activity Tolerance  Increased Fatigue  Decreased Cognition   INTERVENTIONS PLANNED: (Benefits and precautions of physical therapy have been discussed with the patient.)  Balance Exercise  Bed Mobility  Family Education  Gait Training  Home Exercise Program (HEP)  Neuromuscular Re-education/Strengthening  Therapeutic Activites  Therapeutic Exercise/Strengthening  Transfer Training     TREATMENT PLAN: Frequency/Duration: 3 times a week for duration of hospital stay  Rehabilitation Potential For Stated Goals: [facility] (at time of discharge pending progress):    Placement: It is my opinion, based on this patient's performance to date, that Ms. Pamela Moy may benefit from intensive therapy at a [address] [place] [address] after discharge due to the functional deficits listed above that are likely to improve with skilled rehabilitation and concerns that he/she may be unsafe to be unsupervised at home due to decreased balance and mobility.   Equipment:   None at this time              HISTORY:   History of Present Injury/Illness (Reason for Referral):  Sepsis  Past Medical History/Comorbidities:   Ms. Pamela Moy  has a past medical history of Acute renal failure ([address] (June, 2008), Arthritis, Asthma, CAD (coronary artery disease), COPD (chronic obstructive pulmonary disease) ([address] Deep vein thrombosis of lower leg ([facility] (June, 2008), Diabetes [facility]), Diabetes mellitus [facility]), Dislocation of right shoulder joint, Dyspnea on exertion (October, 2010), GERD (gastroesophageal reflux disease), High cholesterol, Hypertension, Hypothyroid, Metabolic encephalopathy (2/3/9325), Morbid obesity ([address] Myocardial infarction ([address] (1991), Osteomyelitis of left foot (HCC) (???), Restless leg syndrome, Sepsis secondary to UTI ([address] (5/10/2020), and Sleep apnea. She also has no past medical history of Aneurysm ([address] Arrhythmia, Autoimmune disease ([address] Cancer ([address] Chronic kidney disease, Coagulation defects, Dementia, Difficult intubation, Liver disease, Malignant hyperthermia due to anesthesia, Nausea & vomiting, Pseudocholinesterase deficiency, Psychiatric disorder, PUD (peptic ulcer disease), Stroke ([address] or Thyroid disease. Ms. Deborah Sanchez  has a past surgical history that includes hx tonsil and adenoidectomy (Childhood); hx coronary stent placement (1997 & 2010); hx hernia repair (2006); hx breast biopsy (2000); hx tubal ligation (1977); hx tonsillectomy; hx adenoidectomy; pr cardiac surg procedure unlist; hx knee replacement (May, 2008); hx orthopaedic; and hx orthopaedic. Social History/Living Environment:   Home Environment: Private residence  One/Two Story Residence: One story  Living Alone: No  Support Systems: Family member(s)  Patient Expects to be Discharged to[de-identified] Skilled nursing facility  Current DME Used/Available at Home: Other (comment)  Prior Level of Function/Work/Activity:  Unsure given pt's confusion     Number of Personal Factors/Comorbidities that affect the Plan of Care: 3+: HIGH COMPLEXITY   EXAMINATION:   Most Recent Physical Functioning:   Gross Assessment:  AROM: Generally decreased, functional  Strength: Grossly decreased, non-functional               Posture:     Balance:  Sitting: Impaired  Sitting - Static: Fair (occasional)  Sitting - Dynamic: Poor (constant support) Bed Mobility:  Rolling: Moderate assistance  Supine to Sit: Moderate assistance;Maximum assistance  Sit to Supine: Maximum assistance  Scooting: Maximum assistance; Total assistance  Wheelchair Mobility:     Transfers:     Gait:            Body Structures Involved:  Metabolic  Endocrine  Muscles Body Functions Affected:  Mental  Genitourinary  Neuromusculoskeletal  Movement Related  Metobolic/Endocrine Activities and Participation Affected:  Learning and Applying Knowledge  General Tasks and Demands  [address] and [address]   Number of elements that affect the Plan of Care: 4+: HIGH COMPLEXITY   CLINICAL PRESENTATION:   Presentation: Stable and uncomplicated: LOW COMPLEXITY   CLINICAL DECISION MAKIN [facility] Mobility Inpatient Short Form  How much difficulty does the patient currently have. .. Unable A Lot A Little None   1. Turning over in bed (including adjusting bedclothes, sheets and blankets)? [] 1   [x] 2   [] 3   [] 4   2. Sitting down on and standing up from a chair with arms ( e.g., wheelchair, bedside commode, etc.)   [x] 1   [] 2   [] 3   [] 4   3. Moving from lying on back to sitting on the side of the bed? [] 1   [x] 2   [] 3   [] 4   How much help from another person does the patient currently need. .. Total A Lot A Little None   4. Moving to and from a bed to a chair (including a wheelchair)? [x] 1   [] 2   [] 3   [] 4   5. Need to walk in hospital room? [x] 1   [] 2   [] 3   [] 4   6. Climbing 3-5 steps with a railing? [x] 1   [] 2   [] 3   [] 4   © , Trustees of [facility] [employer], under license to [employer] All rights reserved      Score:  Initial: 8 Most Recent: X (Date: -- )    Interpretation of Tool:  Represents activities that are increasingly more difficult (i.e. Bed mobility, Transfers, Gait). Medical Necessity:     Patient demonstrates   fair   rehab potential due to higher previous functional level. Reason for Services/Other Comments:  Patient continues to require skilled intervention due to   Decreased balance and mobility   .    Use of outcome tool(s) and clinical judgement create a POC that gives a: Clear prediction of patient's progress: LOW COMPLEXITY            TREATMENT:   (In addition to Assessment/Re-Assessment sessions the following treatments were rendered)   Pre-treatment Symptoms/Complaints:  Confused  Pain: Initial:   Pain Intensity 1: 0  Post Session:  0     Therapeutic Activity: (    14 minutes): Therapeutic activities including rolling, scooting, supine to sit <>, and sitting EOB activities to improve mobility, strength, balance, coordination, and activity tolerance . Required moderate-max cuing   to promote static and dynamic balance in sitting and promote coordination of bilateral, upper extremity(s), lower extremity(s). Braces/Orthotics/Lines/Etc:   IV  Pure wick   O2 Device: Room air  Treatment/Session Assessment:    Response to Treatment:  Tolerated poorly given decreased command following. Interdisciplinary Collaboration:   Physical Therapist  Occupational Therapist  Registered Nurse  Certified Nursing Assistant/Patient Care Technician  After treatment position/precautions:   Supine in bed  Bed/Chair-wheels locked  Bed in low position  Call light within reach  Side rails x 3   Compliance with Program/Exercises: Will assess as treatment progresses  Recommendations/Intent for next treatment session: \"Next visit will focus on advancements to more challenging activities and reduction in assistance provided\".   Total Treatment Duration:  PT Patient Time In/Time Out  Time In: 1349  Time Out: [address] Adam, PT, DPT

## 2020-05-11 NOTE — PROGRESS NOTES
Per notes patient may be going home with hospice soon  Did not visit          Precautions:  PPE worn on all visits  No contact with patient or family by touch  Social distancing observed          Scott Pantoja, staff [address], [address] [facility]  /   [email or web address]

## 2020-05-11 NOTE — PROGRESS NOTES
Hospitalist Note     Admit Date:  2020  5:35 PM   Name:  Yusuf Jones   Age:  67 y.o.  :  1940   MRN:  [id number]   PCP:  Javad Foy MD  Treatment Team: Attending Provider: Sima Elizalde MD; Physical Therapist: Torito Ornelas, PT, DPT; Charge Nurse: Wong Saleh; Occupational Therapist: Jayna Cartagena OT; Care Manager: Rogelio Amaral RN    HPI/Subjective:   Pt is a 68 y/o F with dementia, \"poor\" baseline status per family, who presented with worsened confusion, less responsive. Had tachycardia, elevated WBC on admit with low grade temp 100. Possible seizure activity observed after admission. Started on rocephin initially, then expanded to vanc/rocephin.  - pt alert today which is much better. She denies complaints. No pain, SOB, dysuria, abd pain. CM relays to me that son found paperwork stating pt is to be on home hospice and does not want more interventions. Objective:     Patient Vitals for the past 24 hrs:   Temp Pulse Resp BP SpO2   20 0828 99.2 °F (37.3 °C) 80 19 190/68 95 %   20 0721     96 %   20 0513 98.5 °F (36.9 °C) 62 19 139/57 95 %   05/10/20 2343 99 °F (37.2 °C) 72 19 97/64 95 %   05/10/20 2304     98 %   05/10/20 1955 97.7 °F (36.5 °C) 84 18 138/62 96 %   05/10/20 1934     98 %   05/10/20 1537 98.1 °F (36.7 °C) 86 18 140/70 98 %   05/10/20 1459     95 %   05/10/20 1152 98.5 °F (36.9 °C) 62 21 124/55 94 %     Oxygen Therapy  O2 Sat (%): 95 % (20 0828)  Pulse via Oximetry: 69 beats per minute (20)  O2 Device: Room air (20)  O2 Flow Rate (L/min): 0 l/min (20)    Estimated body mass index is 40.28 kg/m² as calculated from the following:    Height as of 20: 5' 1\" (1.549 m). Weight as of this encounter: 96.7 kg (213 lb 3 oz).     No intake or output data in the 24 hours ending 20 1002    *Note that automatically entered I/Os may not be accurate; dependent on patient compliance with collection and accurate  by "[employer]. General:     Chronically ill appearing. obese  CV:   RRR. No murmur, rub, or gallop. Lungs:   Diminished anteriorly. No wheezing, rhonchi, or rales. Abdomen:   Soft, nontender, nondistended. Extremities: Warm and dry. No cyanosis or edema. Skin:     No rashes or jaundice. Neuro:  No gross focal deficits    Data Review:  I have reviewed all labs, meds, and studies from the last 24 hours:    Recent Results (from the past 24 hour(s))   GLUCOSE, POC    Collection Time: 05/10/20 11:16 AM   Result Value Ref Range    Glucose (POC) 121 (H) 65 - 100 mg/dL   SED RATE, AUTOMATED    Collection Time: 05/10/20  1:55 PM   Result Value Ref Range    Sed rate, automated 17 0 - 30 mm/hr   GLUCOSE, POC    Collection Time: 05/10/20  4:07 PM   Result Value Ref Range    Glucose (POC) 97 65 - 100 mg/dL   GLUCOSE, POC    Collection Time: 05/10/20  8:58 PM   Result Value Ref Range    Glucose (POC) 124 (H) 65 - 100 mg/dL   MAGNESIUM    Collection Time: 05/11/20  6:11 AM   Result Value Ref Range    Magnesium 2.7 (H) 1.8 - 2.4 mg/dL   CBC WITH AUTOMATED DIFF    Collection Time: 05/11/20  6:11 AM   Result Value Ref Range    WBC 11.0 4.3 - 11.1 K/uL    RBC 4.11 4.05 - 5.2 M/uL    HGB 12.3 11.7 - 15.4 g/dL    HCT 39.5 35.8 - 46.3 %    MCV 96.1 79.6 - 97.8 FL    MCH 29.9 26.1 - 32.9 PG    MCHC 31.1 (L) 31.4 - 35.0 g/dL    RDW 14.4 11.9 - 14.6 %    PLATELET 647 085 - 142 K/uL    MPV 11.4 9.4 - 12.3 FL    ABSOLUTE NRBC 0.00 0.0 - 0.2 K/uL    DF AUTOMATED      NEUTROPHILS 69 43 - 78 %    LYMPHOCYTES 21 13 - 44 %    MONOCYTES 8 4.0 - 12.0 %    EOSINOPHILS 1 0.5 - 7.8 %    BASOPHILS 1 0.0 - 2.0 %    IMMATURE GRANULOCYTES 0 0.0 - 5.0 %    ABS. NEUTROPHILS 7.6 1.7 - 8.2 K/UL    ABS. LYMPHOCYTES 2.3 0.5 - 4.6 K/UL    ABS. MONOCYTES 0.9 0.1 - 1.3 K/UL    ABS. EOSINOPHILS 0.1 0.0 - 0.8 K/UL    ABS. BASOPHILS 0.1 0.0 - 0.2 K/UL    ABS. IMM.  GRANS. 0.0 0.0 - 0.5 K/UL   METABOLIC PANEL, BASIC    Collection Time: 05/11/20  6:11 AM   Result Value Ref Range    Sodium 147 (H) 136 - 145 mmol/L    Potassium 3.7 3.5 - 5.1 mmol/L    Chloride 117 (H) 98 - 107 mmol/L    CO2 23 21 - 32 mmol/L    Anion gap 7 7 - 16 mmol/L    Glucose 114 (H) 65 - 100 mg/dL    BUN 22 8 - 23 MG/DL    Creatinine 0.88 0.6 - 1.0 MG/DL    GFR est AA >60 >60 ml/min/1.73m2    GFR est non-AA >60 >60 ml/min/1.73m2    Calcium 10.0 8.3 - 10.4 MG/DL   GLUCOSE, POC    Collection Time: 05/11/20  7:40 AM   Result Value Ref Range    Glucose (POC) 88 65 - 100 mg/dL        All Micro Results     None          Current Meds:  Current Facility-Administered Medications   Medication Dose Route Frequency    levETIRAcetam (KEPPRA) 500 mg in 0.9% sodium chloride (MBP/ADV) 100 mL  500 mg IntraVENous Q12H    cefTRIAXone (ROCEPHIN) 1 g in 0.9% sodium chloride (MBP/ADV) 50 mL  1 g IntraVENous Q12H    LORazepam (ATIVAN) injection 0.5 mg  0.5 mg IntraVENous Q2H PRN    hydrALAZINE (APRESOLINE) 20 mg/mL injection 20 mg  20 mg IntraVENous Q4H PRN    hydrALAZINE (APRESOLINE) 20 mg/mL injection 10 mg  10 mg IntraVENous Q4H PRN    insulin glargine (LANTUS) injection 15 Units  15 Units SubCUTAneous QHS    insulin lispro (HUMALOG) injection   SubCUTAneous AC&HS    tuberculin injection 5 Units  5 Units IntraDERMal ONCE    acetaminophen (TYLENOL) tablet 650 mg  650 mg Oral Q4H PRN    naloxone (NARCAN) injection 0.4 mg  0.4 mg IntraVENous PRN    diphenhydrAMINE (BENADRYL) capsule 25 mg  25 mg Oral Q4H PRN    ondansetron (ZOFRAN) injection 4 mg  4 mg IntraVENous Q4H PRN    senna-docusate (PERICOLACE) 8.6-50 mg per tablet 2 Tab  2 Tab Oral DAILY PRN    valsartan (DIOVAN) tablet 320 mg  320 mg Oral DAILY    metoprolol succinate (TOPROL-XL) tablet 100 mg  100 mg Oral DAILY    atorvastatin (LIPITOR) tablet 80 mg  80 mg Oral QHS    gabapentin (NEURONTIN) capsule 300 mg  300 mg Oral TID    aspirin chewable tablet 81 mg  81 mg Oral DAILY  levothyroxine (SYNTHROID) tablet 75 mcg  75 mcg Oral 6am    albuterol (PROVENTIL VENTOLIN) nebulizer solution 2.5 mg  2.5 mg Nebulization Q6H PRN    FLUoxetine (PROzac) capsule 20 mg  20 mg Oral DAILY    budesonide (PULMICORT) 500 mcg/2 ml nebulizer suspension  500 mcg Nebulization BID RT    And    albuterol (PROVENTIL VENTOLIN) nebulizer solution 2.5 mg  2.5 mg Nebulization Q6HWA RT       Other Studies:  No results found for this visit on 05/09/20. Xr Chest Sngl V    Result Date: 5/10/2020  Chest portable CLINICAL INDICATION: Follow-up of pneumonia, subacute dyspnea, metabolic encephalopathy, chronic dyspnea on exertion COMPARISON: 1/29/2019 TECHNIQUE: single AP portable view chest at 12:10 PM semiupright FINDINGS: Patient is rotated to the left. Lung volumes are well inflated. Mediastinal and hilar contours are stable. No evidence of consolidation, pneumothorax, pleural effusion or CHF. Coronary artery stent material again noted. Bone density is low throughout. Right shoulder hardware is partially seen. IMPRESSION: Cardiomegaly. No consolidation.          Assessment and Plan:     Hospital Problems as of 5/11/2020 Date Reviewed: 2/16/2018          Codes Class Noted - Resolved POA    Meningioma ([facility] (Chronic) ICD-10-CM: D32.9  ICD-9-CM: 225.2  5/10/2020 - Present Yes        * (Principal) Sepsis ([facility] ICD-10-CM: A41.9  ICD-9-CM: 038.9, 995.91  5/10/2020 - Present Yes        Dementia with behavioral disturbance (HCC) (Chronic) ICD-10-CM: F03.91  ICD-9-CM: 294.21  5/10/2020 - Present Yes        DNR (do not resuscitate) (Chronic) ICD-10-CM: Z66  ICD-9-CM: V49.86  5/10/2020 - Present Yes        Postictal state ([facility] ICD-10-CM: R56.9  ICD-9-CM: 780.39  5/10/2020 - Present Yes        Acute metabolic encephalopathy AYR-86-UR: G93.41  ICD-9-CM: 348.31  5/9/2020 - Present Yes        Hypertension (Chronic) ICD-10-CM: I10  ICD-9-CM: 401.9  5/9/2020 - Present Yes        CLAUDE (obstructive sleep apnea) (Chronic) ICD-10-CM: G47.33  ICD-9-CM: 327.23  9/12/2011 - Present Yes        Seizures ([facility] ICD-10-CM: R56.9  ICD-9-CM: 780.39  9/10/2011 - Present Yes        CAD (coronary artery disease), native coronary artery (Chronic) ICD-10-CM: I25.10  ICD-9-CM: 414.01  10/9/2010 - Present Yes        Morbid obesity ([facility] (Chronic) ICD-10-CM: E66.01  ICD-9-CM: 278.01  10/9/2010 - Present Yes        Essential hypertension, benign (Chronic) ICD-10-CM: I10  ICD-9-CM: 401.1  10/9/2010 - Present Yes        Dyslipidemia (Chronic) ICD-10-CM: E78.5  ICD-9-CM: 272.4  10/9/2010 - Present Yes        Diabetes mellitus ([facility] (Chronic) ICD-10-CM: E11.9  ICD-9-CM: 250.00  10/9/2010 - Present Yes              Plan:  Encephalopathy  -multifactorial, some baseline dementia  -treat underlying illnesses  -also has some chronic white matter changes, progressed on MRI. Has underlying dementia per admit note   -use CPAP while unresponsive or sleeping due to CLAUDE    Possible Seizure  -MRI negative except for known meningioma  -home keppra   -EEG pending  -PRN ativan but reduce dose, start low and may give additional doses if needed    Possible Sepsis  -unclear if any true infection  -CXR neg. CRP and ESR neg. PCT neg x2. Lactate neg. UA with seemingly sterile pyuria. Cultures NGTD  -doubt infection given above.   Stop abx today     HTN  -cont current meds  -IV hydralazine PRN if cannot take PO    DC planning/Dispo:    -PPD/PT/OT ordered    Diet:  DIET DIABETIC CONSISTENT CARB  DVT ppx:  SCDs    Signed:  Oliver Mitchell MD

## 2020-05-11 NOTE — PROGRESS NOTES
Care Management Interventions  PCP Verified by CM: Yes(Dr. Chrissy White)  Mode of Transport at Discharge: BLS  Transition of Care Consult (CM Consult): Discharge Planning  Discharge Durable Medical Equipment: Yes(Rollator)  Physical Therapy Consult: Yes  Occupational Therapy Consult: Yes  Speech Therapy Consult: No  Current Support Network: Own Home, Other(Patients daughter lives with her)  Confirm Follow Up Transport: Family  The Plan for Transition of Care is Related to the Following Treatment Goals : comfort care  The Patient and/or Patient Representative was Provided with a Choice of Provider and Agrees with the Discharge Plan?: Yes  Name of the Patient Representative Who was Provided with a Choice of Provider and Agrees with the Discharge Plan: Patient Son Evin Garcia of Choice List was Provided with Basic Dialogue that Supports the Patient's Individualized Plan of Care/Goals, Treatment Preferences and Shares the Quality Data Associated with the Providers?: Yes  [place] Resource Information Provided?: No  Discharge Location  Discharge Placement: Home with one level    CM spoke with patient son, Ariel Ritchie [phone], as patient is confused. Ariel Ritchie stated patient lives with his sister. Patient uses a rollator daily. Ariel Ritchie stated patient is to use a CPAP at  but has not been wearing one as it broke months ago. Ariel Ritchie stated patient is scheduled for a sleep study so she can get a new machine. Ariel Ritchie stated he has the POA papers and it states patient wishes as well. Ariel Ritchie stated he would email the paper work to this CM to put in patients chart. Ariel Ritchie was asking questions regarding hospice. CM provided Ariel Ritchie with information regarding hospice and provided a list of agencies. Ariel Ritchie stated he would like for patient to discharge home with hospice. Ariel Ritchie stated he would reach out to the agencies and let CM know which agency he prefers. CM will notify physician of discharge plan.   CM will continue to follow patient during hospitalization for discharge planning and needs. Please consult CM for new needs.

## 2020-05-11 NOTE — PROGRESS NOTES
Problem: Self Care Deficits Care Plan (Adult)  Goal: *Acute Goals and Plan of Care (Insert Text)  Description: 1. Pt will toilet with min A   2. Pt will complete functional mobility for ADLs with min A  3. Pt will complete upper body bathing and dressing with min A   4. Pt will complete grooming and hygiene  with set up  5. Pt will complete HEP with min cuesto promote increased BUE strength and functional use for ADLs  6. Pt will maintain sitting balance for ADLs with SBA  7. Pt will complete bed mobility with min A in prep for ADLs    Timeframe: 7 days     Outcome: Progressing Towards Goal     OCCUPATIONAL THERAPY: Initial Assessment and Daily Note 5/11/2020  INPATIENT: OT Visit Days: 1  [place] / [place] / Product Type: Medicare /      NAME/AGE/GENDER: Serenity Kirk is a 67 y.o. female   PRIMARY DIAGNOSIS:  Metabolic encephalopathy [B95.82] Sepsis ([facility] [address] Sepsis ([facility] [address]        ICD-10: Treatment Diagnosis:    · Generalized Muscle Weakness (M62.81)   Precautions/Allergies:    falls Coenzyme q10; Niacin (inositol niacinate); Adhesive tape-silicones; Ambien [zolpidem]; Oxycodone; Pcn [penicillins]; and Ramipril      ASSESSMENT:     Ms. Jt Avalos was admitted with confusion and decreased responsiveness, diagnosis of sepsis. Rapid response was called for pt d/t seizure like activity. Pt has a hx of dementia. Pt able to provide only limited PLOF information d/t cognitive deficits but did report that she lives with her daughter, uses a walker, and requires assistance for bathing, dressing, and toileting. This session, pt presented with deficits in cognition, mobility, balance, strength, and endurance impacting ADLs. Pt A&O to person only, pleasantly confused with impaired safety, attention, and memory and required mod- max cues to safely and successfully initiate and complete all tasks. Pt required min-mod X2 for bed mobility and to stand.  Pt with posterior lean and unable to maintain sitting balance, stood only briefly before abruptly sitting back down. Max X2 to safely return to bed. Pt would benefit from skilled OT services to address deficits to promote increased ADL safety and independence. Recommend d/c to SNF. This section established at most recent assessment   PROBLEM LIST (Impairments causing functional limitations):  1. Decreased Strength  2. Decreased ADL/Functional Activities  3. Decreased Transfer Abilities  4. Decreased Balance  5. Decreased Activity Tolerance  6. Decreased Cognition   INTERVENTIONS PLANNED: (Benefits and precautions of occupational therapy have been discussed with the patient.)  1. Activities of daily living training  2. Adaptive equipment training  3. Balance training  4. Cognitive training  5. Therapeutic activity  6. Therapeutic exercise     TREATMENT PLAN: Frequency/Duration: Follow patient 3 times/week to address above goals. Rehabilitation Potential For Stated Goals: [facility] (at time of discharge pending progress):    Placement: It is my opinion, based on this patient's performance to date, that Ms. Kennedy Fenton may benefit from intensive therapy at a [address] [place] [address] after discharge due to the functional deficits listed above that are likely to improve with skilled rehabilitation and inability to mobilize or complete ADLs at level necessary to safely return home.   Equipment:    None at this time              OCCUPATIONAL PROFILE AND HISTORY:   History of Present Injury/Illness (Reason for Referral):  See H&P  Past Medical History/Comorbidities:   Ms. Kennedy Fenton  has a past medical history of Acute renal failure ([address] (June, 2008), Arthritis, Asthma, CAD (coronary artery disease), COPD (chronic obstructive pulmonary disease) ([address] Deep vein thrombosis of lower leg ([address] (June, 2008), Diabetes [facility]), Diabetes mellitus [facility]), Dislocation of right shoulder joint, Dyspnea on exertion (October, 2010), GERD (gastroesophageal reflux disease), High cholesterol, Hypertension, Hypothyroid, Metabolic encephalopathy (8/7/0066), Morbid obesity ([facility] [facility] Myocardial infarction ([facility] [address] (1991), Osteomyelitis of left foot ([facility] [address] (???), Restless leg syndrome, Sepsis secondary to UTI ([address] (5/10/2020), and Sleep apnea. She also has no past medical history of Aneurysm ([address] Arrhythmia, Autoimmune disease ([address] Cancer ([address] Chronic kidney disease, Coagulation defects, Dementia, Difficult intubation, Liver disease, Malignant hyperthermia due to anesthesia, Nausea & vomiting, Pseudocholinesterase deficiency, Psychiatric disorder, PUD (peptic ulcer disease), Stroke ([address] or Thyroid disease. Ms. Fatemeh Bloom  has a past surgical history that includes hx tonsil and adenoidectomy (Childhood); hx coronary stent placement (1997 & 2010); hx hernia repair (2006); hx breast biopsy (2000); hx tubal ligation (1977); hx tonsillectomy; hx adenoidectomy; pr cardiac surg procedure unlist; hx knee replacement (May, 2008); hx orthopaedic; and hx orthopaedic. Social History/Living Environment:   Home Environment: Private residence  One/Two Story Residence: One story  Living Alone: No  Support Systems: Family member(s)  Patient Expects to be Discharged to[de-identified] Unknown  Current DME Used/Available at Home: [employer], [address] [employer] chair  Prior Level of Function/Work/Activity:  See assessment     Number of Personal Factors/Comorbidities that affect the Plan of Care: Expanded review of therapy/medical records (1-2):  MODERATE COMPLEXITY   ASSESSMENT OF OCCUPATIONAL PERFORMANCE[de-identified]   Activities of Daily Living:   Basic ADLs (From Assessment) Complex ADLs (From Assessment)   Feeding: Minimum assistance  Oral Facial Hygiene/Grooming: Minimum assistance  Bathing: Maximum assistance  Upper Body Dressing: Moderate assistance  Lower Body Dressing: Maximum assistance  Toileting: Maximum assistance Instrumental ADL  Meal Preparation: Adaptive equipment  Homemaking:  Total assistance   Grooming/Bathing/Dressing Activities of Daily Living     Cognitive Retraining  Safety/Judgement: Decreased awareness of environment;Decreased awareness of need for safety;Decreased insight into deficits; Fall prevention                       Bed/Mat Mobility  Rolling: Moderate assistance  Supine to Sit: Moderate assistance;Maximum assistance  Sit to Supine: Maximum assistance  Sit to Stand: Minimum assistance; Moderate assistance;Assist x2  Stand to Sit: Moderate assistance  Scooting: Maximum assistance; Total assistance     Most Recent Physical Functioning:   Gross Assessment:  AROM: Generally decreased, functional  Strength: Generally decreased, functional  Coordination: Generally decreased, functional               Posture:     Balance:  Sitting: Impaired  Sitting - Static: Fair (occasional)  Sitting - Dynamic: Poor (constant support)  Standing: Impaired  Standing - Static: Fair  Standing - Dynamic : Poor Bed Mobility:  Rolling: Moderate assistance  Supine to Sit: Moderate assistance;Maximum assistance  Sit to Supine: Maximum assistance  Scooting: Maximum assistance; Total assistance  Wheelchair Mobility:     Transfers:  Sit to Stand: Minimum assistance; Moderate assistance;Assist x2  Stand to Sit: Moderate assistance            Patient Vitals for the past 6 hrs:   BP SpO2 Pulse   05/11/20 1214 191/81 97 % (!) 58       Mental Status  Neurologic State: Alert  Orientation Level: Disoriented to place, Disoriented to time, Oriented to person  Cognition: Decreased attention/concentration, Decreased command following, Poor safety awareness  Perception: Cues to maintain midline in sitting  Perseveration: Perseverates during conversation, Perseverates during mobility  Safety/Judgement: Decreased awareness of environment, Decreased awareness of need for safety, Decreased insight into deficits, Fall prevention                          Physical Skills Involved:  1. Balance  2. Strength  3.  Activity Tolerance Cognitive Skills Affected (resulting in the inability to perform in a timely and safe manner):  1. Executive Function  2. Immediate Memory  3. Short Term Recall  4. Long Term Memory  5. Sustained Attention  6. Divided Attention  7. Comprehension Psychosocial Skills Affected:  1. Habits/Routines  2. Environmental Adaptation  3. Self-Awareness   Number of elements that affect the Plan of Care: 5+:  HIGH COMPLEXITY   CLINICAL DECISION MAKIN[address] [place] [address] AM-PAC 6 Clicks   Daily Activity Inpatient Short Form  How much help from another person does the patient currently need. .. Total A Lot A Little None   1. Putting on and taking off regular lower body clothing? [] 1   [x] 2   [] 3   [] 4   2. Bathing (including washing, rinsing, drying)? [] 1   [x] 2   [] 3   [] 4   3. Toileting, which includes using toilet, bedpan or urinal?   [] 1   [x] 2   [] 3   [] 4   4. Putting on and taking off regular upper body clothing? [] 1   [] 2   [x] 3   [] 4   5. Taking care of personal grooming such as brushing teeth? [] 1   [] 2   [x] 3   [] 4   6. Eating meals? [] 1   [] 2   [x] 3   [] 4   © , Trustees of [address] [place] [address], under license to [employer]. All rights reserved      Score:  Initial: 15 Most Recent: X (Date: -- )    Interpretation of Tool:  Represents activities that are increasingly more difficult (i.e. Bed mobility, Transfers, Gait). Medical Necessity:     · Patient is expected to demonstrate progress in strength, balance and functional technique to decrease assistance required with ADLs. Reason for Services/Other Comments:  · Patient continues to require present interventions due to patient's inability to complete ADLs.    Use of outcome tool(s) and clinical judgement create a POC that gives a: MODERATE COMPLEXITY         TREATMENT:   (In addition to Assessment/Re-Assessment sessions the following treatments were rendered)     Pre-treatment Symptoms/Complaints:    Pain: Initial:   Pain Intensity 1: 0  Post Session:  0 Therapeutic Activity: (    8 min): Therapeutic activities including Bed transfers and sitting edge of bed to improve mobility, balance and endurance for ADLs. Braces/Orthotics/Lines/Etc:   · O2 Device: Room air  Treatment/Session Assessment:    · Response to Treatment:  No adverse reaction   · Interdisciplinary Collaboration:   o Occupational Therapist  o Registered Nurse  · After treatment position/precautions:   o Supine in bed  o Bed alarm/tab alert on  o Bed/Chair-wheels locked  o Bed in low position  o Call light within reach  o RN notified   · Compliance with Program/Exercises: Will assess as treatment progresses. · Recommendations/Intent for next treatment session: \"Next visit will focus on advancements to more challenging activities and reduction in assistance provided\".   Total Treatment Duration:  OT Patient Time In/Time Out  Time In: 1052  Time Out: Lisa Pham 85 MIMI Mcdonald 134 lbs, height is 64 in, BMI is 24.2/current

## 2020-05-11 NOTE — PROGRESS NOTES
05/1940   Oxygen Therapy   O2 Sat (%) 95 %   Pulse via Oximetry 66 beats per minute   O2 Device Room air   CPAP/BIPAP   CPAP/BIPAP Start/Stop On   Device Mode CPAP, auto-titrating   Mask Type and Size Large   Skin Condition dry, intact   Pt's Home Machine No

## 2020-05-11 NOTE — PROGRESS NOTES
PT Note:  Evaluation received and treatment attempted. Pt getting EEG and will hold this AM.  Will re-attempt pending schedule and pt status.   Thanks,  [place]co [place], PT, DPT

## 2020-05-11 NOTE — PROGRESS NOTES
CM spoke with patient son [employer] who stated he has now decided he would like for patient to discharge to [facility] before going home. [employer] stated he would like [facility] and [facility]. Referrals have been sent to both facilities.

## 2020-05-11 NOTE — PROGRESS NOTES
Interdisciplinary Rounds completed. Nursing, Case Management, and Physician  present. Plan of care reviewed and updated. Rehab at discharge. PT/OT/PPD/COVID test all ordered.

## 2020-05-11 NOTE — PROGRESS NOTES
Patient son Nirmal Valentine called and provided CM with [facility] as his choice for hospice care for his mother. Referral has been sent.

## 2020-05-12 VITALS
WEIGHT: 213.19 LBS | BODY MASS INDEX: 40.28 KG/M2 | RESPIRATION RATE: 18 BRPM | HEART RATE: 71 BPM | TEMPERATURE: 98 F | DIASTOLIC BLOOD PRESSURE: 82 MMHG | SYSTOLIC BLOOD PRESSURE: 178 MMHG | OXYGEN SATURATION: 90 %

## 2020-05-12 LAB
BACTERIA SPEC CULT: NORMAL
GLUCOSE BLD STRIP.AUTO-MCNC: 116 MG/DL (ref 65–100)
GLUCOSE BLD STRIP.AUTO-MCNC: 125 MG/DL (ref 65–100)
SERVICE CMNT-IMP: NORMAL

## 2020-05-12 PROCEDURE — 74011000250 HC RX REV CODE- 250: Performed by: FAMILY MEDICINE

## 2020-05-12 PROCEDURE — 94640 AIRWAY INHALATION TREATMENT: CPT

## 2020-05-12 PROCEDURE — 74011250637 HC RX REV CODE- 250/637: Performed by: FAMILY MEDICINE

## 2020-05-12 PROCEDURE — 94760 N-INVAS EAR/PLS OXIMETRY 1: CPT

## 2020-05-12 PROCEDURE — 74011250637 HC RX REV CODE- 250/637: Performed by: INTERNAL MEDICINE

## 2020-05-12 PROCEDURE — 82962 GLUCOSE BLOOD TEST: CPT

## 2020-05-12 RX ORDER — AMLODIPINE BESYLATE 10 MG/1
10 TABLET ORAL DAILY
Qty: 30 TAB | Refills: 1 | Status: SHIPPED | OUTPATIENT
Start: 2020-05-12

## 2020-05-12 RX ORDER — LEVETIRACETAM 500 MG/1
500 TABLET ORAL 2 TIMES DAILY
Qty: 60 TAB | Refills: 1 | Status: SHIPPED | OUTPATIENT
Start: 2020-05-12

## 2020-05-12 RX ADMIN — ASPIRIN 81 MG 81 MG: 81 TABLET ORAL at 08:54

## 2020-05-12 RX ADMIN — VALSARTAN 320 MG: 80 TABLET ORAL at 08:54

## 2020-05-12 RX ADMIN — ALBUTEROL SULFATE 2.5 MG: 2.5 SOLUTION RESPIRATORY (INHALATION) at 07:30

## 2020-05-12 RX ADMIN — LEVETIRACETAM 500 MG: 500 TABLET ORAL at 08:54

## 2020-05-12 RX ADMIN — LEVOTHYROXINE SODIUM 75 MCG: 0.07 TABLET ORAL at 05:55

## 2020-05-12 RX ADMIN — GABAPENTIN 300 MG: 300 CAPSULE ORAL at 08:53

## 2020-05-12 RX ADMIN — METOPROLOL SUCCINATE 100 MG: 100 TABLET, EXTENDED RELEASE ORAL at 08:54

## 2020-05-12 RX ADMIN — FLUOXETINE 20 MG: 20 CAPSULE ORAL at 08:54

## 2020-05-12 RX ADMIN — BUDESONIDE 500 MCG: 0.5 INHALANT RESPIRATORY (INHALATION) at 07:31

## 2020-05-12 NOTE — PROCEDURES
EEG    The study is performed on a multichannel digital EEG device with dermal electrodes    In the initial phase of the study the patient is drowsy and there is fragmentary alpha activity. Biparietal humps appear consistent with drowsiness. Alertness follows and there is activation of the recording. The recording overall is a low of low voltage measuring 25 to 50 µV. There is a good deal of muscle tension artifact identified during the waking state. There are no paroxysmal features identified throughout the recording. I see no evidence of lateralizing phenomena.     Video was viewed    Impression    The electroencephalogram is largely unremarkable it is somewhat poorly reactive and of low voltage but demonstrates no paroxysmal abnormality to suggest ictal activity

## 2020-05-12 NOTE — DISCHARGE SUMMARY
Hospitalist Discharge Summary     Admit Date:  2020  5:35 PM   Name:  James Butler   Age:  67 y.o.  :  1940   MRN:  [id number]   PCP:  Eliot Mcallister MD  Treatment Team: Attending Provider: Raudel Brooks MD; Charge Nurse: Laura Preston; Care Manager: Sissy Burton, RN; Primary Nurse: Huma Pantoja RN    Problem List for this Hospitalization:  Hospital Problems as of 2020 Date Reviewed: 2018          Codes Class Noted - Resolved POA    Meningioma ([facility] (Chronic) ICD-10-CM: D32.9  ICD-9-CM: 225.2  5/10/2020 - Present Yes        * (Principal) Sepsis ([facility] ICD-10-CM: A41.9  ICD-9-CM: 038.9, 995.91  5/10/2020 - Present Yes        Dementia with behavioral disturbance (HCC) (Chronic) ICD-10-CM: F03.91  ICD-9-CM: 294.21  5/10/2020 - Present Yes        DNR (do not resuscitate) (Chronic) ICD-10-CM: Z66  ICD-9-CM: V49.86  5/10/2020 - Present Yes        Postictal state ([facility] ICD-10-CM: R56.9  ICD-9-CM: 780.39  5/10/2020 - Present Yes        Acute metabolic encephalopathy EIJ-35-OB: G93.41  ICD-9-CM: 348.31  2020 - Present Yes        Hypertension (Chronic) ICD-10-CM: I10  ICD-9-CM: 401.9  2020 - Present Yes        CLAUDE (obstructive sleep apnea) (Chronic) ICD-10-CM: G47.33  ICD-9-CM: 327.23  2011 - Present Yes        Seizures ([facility] ICD-10-CM: R56.9  ICD-9-CM: 780.39  9/10/2011 - Present Yes        CAD (coronary artery disease), native coronary artery (Chronic) ICD-10-CM: I25.10  ICD-9-CM: 414.01  10/9/2010 - Present Yes        Morbid obesity ([facility] (Chronic) ICD-10-CM: E66.01  ICD-9-CM: 278.01  10/9/2010 - Present Yes        Essential hypertension, benign (Chronic) ICD-10-CM: I10  ICD-9-CM: 401.1  10/9/2010 - Present Yes        Dyslipidemia (Chronic) ICD-10-CM: E78.5  ICD-9-CM: 272.4  10/9/2010 - Present Yes        Diabetes mellitus ([facility] (Chronic) ICD-10-CM: E11.9  ICD-9-CM: 250.00  10/9/2010 - Present Yes                Admission HPI from 2020:    \" Patient is a 67 y.o. female who presented to the ED for cc altered mental status. Last known well last night. Unable to obtain HPI from patient due to confusion. Hx of HTN, HLD, DM type II, hypothyroidism, COPD, CAD     Vitals- /98     Labs- WBC 13. 6.     CT head with no acute changes. \"    Hospital Course:  Pt is a 68 y/o F with dementia, poor baseline status per family, who presented with worsened confusion, less responsive. Had tachycardia, elevated WBC on admit with low grade temp 100. Possible seizure activity observed after admission. Started on rocephin initially, then expanded to vanc/rocephin. She probably had mild UTI despite unequivocal dipstick results. She improved with abx, finished short course here. EEG no seizure but this was done days after admission so does not rule out. .  Tolerating keppra welll so will continue for now. Family wants home hospice so will arrange. They should consider de-escalating care tio if she comes back to hospital.    BP high on home meds so norvasc added    Disposition: Home Hospice  Activity: Activity as tolerated  Diet: DIET DIABETIC CONSISTENT CARB Mechanical Soft  Code Status: DNR    Follow Up Orders:  No orders of the defined types were placed in this encounter. Follow-up Information     Follow up With Specialties Details Why Contact Info    Sheri Coy MD [facility] Practice Call As needed [address]  [address] [facility]  [address]  [phone]            Discharge meds at bottom of this note. Plan was discussed with pt. All questions answered. Patient was stable at time of discharge. Given instructions to call a physician or return if any concerns. Discharge summary and encounter summary was sent to PCP electronically via \"Comm Mgt\" link in [facility], if possible.     Diagnostic Imaging/Tests:   Xr Chest Sngl V    Result Date: 5/10/2020  Chest portable CLINICAL INDICATION: Follow-up of pneumonia, subacute dyspnea, metabolic encephalopathy, chronic dyspnea on exertion COMPARISON: 1/29/2019 TECHNIQUE: single AP portable view chest at 12:10 PM semiupright FINDINGS: Patient is rotated to the left. Lung volumes are well inflated. Mediastinal and hilar contours are stable. No evidence of consolidation, pneumothorax, pleural effusion or CHF. Coronary artery stent material again noted. Bone density is low throughout. Right shoulder hardware is partially seen. IMPRESSION: Cardiomegaly. No consolidation. Mri Brain W Wo Cont    Result Date: 5/9/2020  EXAM: MRI of the brain with and without IV contrast. INDICATION: Altered mental status, seizure. COMPARISON: Prior MRI brain on September 17, 2011. TECHNIQUE: Multiecho, multiplanar pre and postcontrast MRI images of the brain were obtained. 20 mL of Dotarem was injected intravenously. FINDINGS: There is moderate volume loss with mild chronic small vessel ischemic changes in the white matter, which have mildly progressed from the prior study. No acute infarct, hemorrhage or evidence of hydrocephalus is seen. There is a 7 mm enhancing dural based lesion along the anterior margin of the right frontal lobe, in retrospect unchanged from the prior study, with the appearance of a small meningioma. It abuts the underlying brain, without causing mass effect or parenchymal edema. The posterior fossa structures are unremarkable. The orbital structures are within normal limits. Again noted is mucosal thickening in the paranasal sinuses. IMPRESSION: 1. No acute intracranial process. 2. Cerebral atrophy and chronic white matter ischemic changes, mildly progressed from the prior study in 2011. 3. Unchanged 7 mm right frontal meningioma. Ct Head Wo Cont    Result Date: 5/9/2020  Noncontrast head CT Clinical Indication: Acute altered mental status, weakness, diminished responsiveness, leukocytosis. History of sinusitis. Technique: Noncontrast axial images were obtained through the brain.  All CT scans at this location are performed using dose modulation techniques as appropriate including the following: Automated exposure control, adjustment of the MA and/or kV according to patient's size, or use of iterative reconstruction technique. Comparison: 3/13/2019 CT at 9/17/2011 MRI Findings: Some detail is limited by patient motion. Technologist reports best possible imaging at this time. There is no acute intracranial hemorrhage, hydrocephalus, intra-axial mass, or mass-effect. Chronic scattered hypodensities are again seen and most compatible with chronic small vessel ischemic change. Small benign dilated perivascular space again noted in right basal ganglia. There is no CT evidence of acute large artery territorial infarction or abnormal extra-axial fluid collection. The mastoid air cells are well aerated. Paranasal sinuses are notable for chronic partial opacification of left maxillary sinus, and trace layering fluid in the right sphenoid sinus that could indicate acute infection. No displaced skull fractures are present. Impression: No CT evidence of acute intracranial abnormality. Paranasal sinus disease. Echocardiogram results:  No results found for this visit on 05/09/20.     Procedures done this admission:  * No surgery found *    All Micro Results     None          SARS-CoV-2 LAB Results  \"Novel Coronavirus\" Test: No results found for: COV2NT   \"Emergent Disease\" Test: No results found for: EDPR  As of: 10:08 AM on 5/12/2020        Labs: Results:       BMP, Mg, Phos Recent Labs     05/11/20  0611 05/10/20  0605 05/09/20  2358 05/09/20  1422   * 145  --  141   K 3.7 3.3*  --  4.0   * 114*  --  108*   CO2 23 22  --  23   AGAP 7 9  --  10   BUN 22 19  --  14   CREA 0.88 0.93  --  0.74   CA 10.0 9.8  --  9.9   * 153*  --  111*   MG 2.7* 2.9* 1.0*  --       CBC Recent Labs     05/11/20  0611 05/10/20  0605 05/09/20  1422   WBC 11.0 15.2* 13.6*   RBC 4.11 4.22 4.72   HGB 12.3 12.9 14.2 HCT 39.5 39.2 43.5    257 246   GRANS 69 80* 73   LYMPH 21 15 21   EOS 1 0* 0*   MONOS 8 5 6   BASOS 1 0 0   IG 0 0 0   ANEU 7.6 12.2* 9.9*   ABL 2.3 2.2 2.9   GAIL 0.1 0.0 0.0   ABM 0.9 0.7 0.8   ABB 0.1 0.0 0.1   AIG 0.0 0.1 0.0      LFT Recent Labs     05/09/20  1422   SGOT 25   ALT 20      TP 6.9   ALB 3.5   GLOB 3.4   AGRAT 1.0*      Cardiac Testing Lab Results   Component Value Date/Time    BNP 46 09/10/2011 09:58 PM    BNP  05/26/2008 08:20 AM     515  <80 pg/mL     CHF not indicated   pg/mL  CHF risk elevated  >100 pg/mL    Indicative of CHF    CK 70 05/09/2020 08:09 PM     (H) 05/26/2008 08:20 AM    CK - MB 17.4 (H) 05/26/2008 08:20 AM    CK-MB Index 2.0 05/26/2008 08:20 AM    Troponin-I <0.05 09/10/2011 09:58 PM    Troponin-I, Qt.  05/27/2008 11:35 PM     0.04  \"A cutoff of >0.60 NG/ML is suggested as  being consistent with the WHO  criteria for AMI. Values ranging from 0.06 to 0.59 represent an  indeterminant/grey zone for injury due to  myocarditis, ischemia, trauma, etc.  Clinical correlation is necessary to determine  the significance of the presence of  Troponin cTnI. Sequential testing is recommended. Cardiac Troponin-I has a relatively long  half-life and may be present well after  the CK MB has returned to baseline. Values ranging from 0.00 to 0.05 NG/ML  represents 97.5 percentile ranking of  individuals from a test population that  demonstrates a healthy clinical picture. \"    Troponin-I, Qt. (H) 05/26/2008 04:40 PM     0.13  \"A cutoff of >0.60 NG/ML is suggested as  being consistent with the WHO  criteria for AMI. Values ranging from 0.06 to 0.59 represent an  indeterminant/grey zone for injury due to  myocarditis, ischemia, trauma, etc.  Clinical correlation is necessary to determine  the significance of the presence of  Troponin cTnI. Sequential testing is recommended.   Cardiac Troponin-I has a relatively long  half-life and may be present well after  the CK MB has returned to baseline. Values ranging from 0.00 to 0.05 NG/ML  represents 97.5 percentile ranking of  individuals from a test population that  demonstrates a healthy clinical picture. \"    Troponin-I, Qt. (H) 05/26/2008 08:20 AM     0.10  \"A cutoff of >0.60 NG/ML is suggested as  being consistent with the WHO  criteria for AMI. Values ranging from 0.06 to 0.59 represent an  indeterminant/grey zone for injury due to  myocarditis, ischemia, trauma, etc.  Clinical correlation is necessary to determine  the significance of the presence of  Troponin cTnI. Sequential testing is recommended. Cardiac Troponin-I has a relatively long  half-life and may be present well after  the CK MB has returned to baseline. Values ranging from 0.00 to 0.05 NG/ML  represents 97.5 percentile ranking of  individuals from a test population that  demonstrates a healthy clinical picture. \"      Coagulation Tests Lab Results   Component Value Date/Time    Prothrombin time 10.9 10/29/2013 02:56 PM    Prothrombin time 10.7 10/08/2010 09:25 AM    Prothrombin time 14.8 (H) 10/04/2010 04:25 PM    INR 1.0 10/29/2013 02:56 PM    INR 1.1 10/08/2010 09:25 AM    INR 1.5 (H) 10/04/2010 04:25 PM    aPTT 24.3 (L) 10/29/2013 02:56 PM    aPTT 30.4 05/26/2008 08:20 AM      A1c Lab Results   Component Value Date/Time    Hemoglobin A1c 6.1 (H) 05/22/2008 05:32 AM      Lipid Panel Lab Results   Component Value Date/Time    Cholesterol, total 125 10/09/2010 06:50 AM    HDL Cholesterol 38 (L) 10/09/2010 06:50 AM    LDL, calculated 65.2 10/09/2010 06:50 AM    VLDL, calculated 21.8 10/09/2010 06:50 AM    Triglyceride 109 10/09/2010 06:50 AM    CHOL/HDL Ratio 3.3 10/09/2010 06:50 AM      Thyroid Panel Lab Results   Component Value Date/Time    TSH 1.200 05/09/2020 11:58 PM    TSH 3.404 09/15/2011 01:45 PM        Most Recent UA Lab Results   Component Value Date/Time    Color YELLOW 06/15/2019 08:00 PM    Appearance CLOUDY 06/15/2019 08:00 PM    Specific gravity 1.007 06/15/2019 08:00 PM    pH (UA) 7.5 06/15/2019 08:00 PM    Protein NEGATIVE  06/15/2019 08:00 PM    Glucose NEGATIVE  06/15/2019 08:00 PM    Ketone NEGATIVE  06/15/2019 08:00 PM    Bilirubin NEGATIVE  06/15/2019 08:00 PM    Blood NEGATIVE  06/15/2019 08:00 PM    Urobilinogen 1.0 06/15/2019 08:00 PM    Nitrites NEGATIVE  06/15/2019 08:00 PM    Leukocyte Esterase LARGE (A) 06/15/2019 08:00 PM    WBC  05/09/2020 03:29 PM    RBC 0-3 05/09/2020 03:29 PM    Epithelial cells 10-20 05/09/2020 03:29 PM    Bacteria 0 05/09/2020 03:29 PM    Casts 0 05/09/2020 03:29 PM    Crystals, urine 0 05/09/2020 03:29 PM    Mucus 0 05/09/2020 03:29 PM    Other observations RESULTS VERIFIED MANUALLY 05/09/2020 03:29 PM        Allergies   Allergen Reactions    Coenzyme Q10 Rash    Niacin (Inositol Niacinate) Rash    Adhesive Tape-Silicones Rash     sts paper tape    Ambien [Zolpidem] Other (comments)     nightmares    Oxycodone Other (comments)     Slurred speech, hallucinations, droopy face, word finding issues    Pcn [Penicillins] Rash    Ramipril Other (comments)     nightmares     Immunization History   Administered Date(s) Administered    TB Skin Test (PPD) Intradermal 10/31/2013, 05/10/2020       All Labs from Last 24 Hrs:  Recent Results (from the past 24 hour(s))   GLUCOSE, POC    Collection Time: 05/11/20 11:28 AM   Result Value Ref Range    Glucose (POC) 128 (H) 65 - 100 mg/dL   GLUCOSE, POC    Collection Time: 05/11/20  4:04 PM   Result Value Ref Range    Glucose (POC) 133 (H) 65 - 100 mg/dL   GLUCOSE, POC    Collection Time: 05/11/20  8:57 PM   Result Value Ref Range    Glucose (POC) 184 (H) 65 - 100 mg/dL   GLUCOSE, POC    Collection Time: 05/12/20  7:33 AM   Result Value Ref Range    Glucose (POC) 116 (H) 65 - 100 mg/dL       Discharge Exam:  Patient Vitals for the past 24 hrs:   Temp Pulse Resp BP SpO2   05/12/20 0833 98 °F (36.7 °C) 71 18 178/82 90 %   05/12/20 0731     93 % 05/12/20 0329 98.1 °F (36.7 °C) 79 20 147/90 97 %   05/12/20 0001 98.4 °F (36.9 °C) 85 18 178/60 95 %   05/11/20 2007 98.1 °F (36.7 °C) 68 20 187/85 96 %   05/1940     95 %   05/11/20 1607 97.8 °F (36.6 °C) 62 18 184/69 97 %   05/11/20 1214 97.9 °F (36.6 °C) (!) 58 18 191/81 97 %     Oxygen Therapy  O2 Sat (%): 90 % (05/12/20 0833)  Pulse via Oximetry: 63 beats per minute (05/12/20 0731)  O2 Device: Room air (05/12/20 0731)  O2 Flow Rate (L/min): 0 l/min (05/11/20 0721)    Estimated body mass index is 40.28 kg/m² as calculated from the following:    Height as of 4/24/20: 5' 1\" (1.549 m). Weight as of this encounter: 96.7 kg (213 lb 3 oz). Intake/Output Summary (Last 24 hours) at 5/12/2020 1010  Last data filed at 5/12/2020 4560  Gross per 24 hour   Intake    Output 450 ml   Net -450 ml       *Note that automatically entered I/Os may not be accurate; dependent on patient compliance with collection and accurate  by assistants. General:    Well nourished. Alert. Eyes:   Normal sclerae. Extraocular movements intact. ENT:  Normocephalic, atraumatic. Moist mucous membranes  CV:   Regular rate and rhythm. No murmur, rub, or gallop. Lungs:  Clear to auscultation bilaterally. No wheezing, rhonchi, or rales. Abdomen: Soft, nontender, nondistended. Extremities: Warm and dry. No cyanosis or edema. Neurologic: CN II-XII grossly intact. No gross focal deficits   Skin:     No rashes or jaundice. Psych:  Normal mood and affect.     Current Med List in Hospital:   Current Facility-Administered Medications   Medication Dose Route Frequency    levETIRAcetam (KEPPRA) tablet 500 mg  500 mg Oral BID    LORazepam (ATIVAN) injection 0.5 mg  0.5 mg IntraVENous Q2H PRN    hydrALAZINE (APRESOLINE) 20 mg/mL injection 20 mg  20 mg IntraVENous Q4H PRN    hydrALAZINE (APRESOLINE) 20 mg/mL injection 10 mg  10 mg IntraVENous Q4H PRN    insulin glargine (LANTUS) injection 15 Units  15 Units SubCUTAneous QHS    insulin lispro (HUMALOG) injection   SubCUTAneous AC&HS    acetaminophen (TYLENOL) tablet 650 mg  650 mg Oral Q4H PRN    naloxone (NARCAN) injection 0.4 mg  0.4 mg IntraVENous PRN    diphenhydrAMINE (BENADRYL) capsule 25 mg  25 mg Oral Q4H PRN    ondansetron (ZOFRAN) injection 4 mg  4 mg IntraVENous Q4H PRN    senna-docusate (PERICOLACE) 8.6-50 mg per tablet 2 Tab  2 Tab Oral DAILY PRN    valsartan (DIOVAN) tablet 320 mg  320 mg Oral DAILY    metoprolol succinate (TOPROL-XL) tablet 100 mg  100 mg Oral DAILY    atorvastatin (LIPITOR) tablet 80 mg  80 mg Oral QHS    gabapentin (NEURONTIN) capsule 300 mg  300 mg Oral TID    aspirin chewable tablet 81 mg  81 mg Oral DAILY    levothyroxine (SYNTHROID) tablet 75 mcg  75 mcg Oral 6am    albuterol (PROVENTIL VENTOLIN) nebulizer solution 2.5 mg  2.5 mg Nebulization Q6H PRN    FLUoxetine (PROzac) capsule 20 mg  20 mg Oral DAILY    budesonide (PULMICORT) 500 mcg/2 ml nebulizer suspension  500 mcg Nebulization BID RT    And    albuterol (PROVENTIL VENTOLIN) nebulizer solution 2.5 mg  2.5 mg Nebulization Q6HWA RT       Discharge Info:   Current Discharge Medication List      START taking these medications    Details   levETIRAcetam (KEPPRA) 500 mg tablet Take 1 Tab by mouth two (2) times a day. Indications: seizure prevention  Qty: 60 Tab, Refills: 1      amLODIPine (NORVASC) 10 mg tablet Take 1 Tab by mouth daily. Indications: high blood pressure  Qty: 30 Tab, Refills: 1         CONTINUE these medications which have NOT CHANGED    Details   colesevelam (WelChoL) 625 mg tablet Take 1,875 mg by mouth daily (with lunch). Saccharomyces boulardii (Florastor) 250 mg capsule Take 250 mg by mouth daily. donepeziL (Aricept) 5 mg tablet Take 5 mg by mouth nightly. metoprolol succinate (TOPROL-XL) 100 mg tablet Take 1 Tab by mouth daily. Take / use AM day of surgery with a sip of water per anesthesia protocols.    Indications: hypertension  Qty: 90 Tab, Refills: 3      atorvastatin (LIPITOR) 80 mg tablet Take 1 Tab by mouth every evening. Indications: hypercholesterolemia  Qty: 90 Tab, Refills: 3      gabapentin (NEURONTIN) 600 mg tablet Take  by mouth three (3) times daily. nitroglycerin (NITROSTAT) 0.4 mg SL tablet by SubLINGual route every five (5) minutes as needed for Chest Pain.      multivitamin (ONE A DAY) tablet Take 1 Tab by mouth daily. aspirin delayed-release 81 mg tablet Take 1 Tab by mouth daily. Qty: 90 Tab, Refills: 3      !! insulin glargine (LANTUS) 100 unit/mL injection 15 Units by SubCUTAneous route every morning. !! insulin glargine (LANTUS) 100 unit/mL injection 15 Units by SubCUTAneous route nightly. levothyroxine (SYNTHROID) 50 mcg tablet Take 75 mcg by mouth Daily (before breakfast). Take / use AM day of surgery with a sip of water per anesthesia protocols. Indications: hypothyroidism      budesonide-formoterol (SYMBICORT) 160-4.5 mcg/Actuation HFA inhaler Take 2 Puffs by inhalation two (2) times a day. Take / use AM day of surgery with a sip of water per anesthesia protocols. albuterol (PROVENTIL, VENTOLIN) 90 mcg/Actuation inhaler Take 2 Puffs by inhalation every four (4) hours as needed for Shortness of Breath. fluoxetine (PROZAC) 20 mg Tab Take 20 mg by mouth daily. Take / use AM day of surgery with a sip of water per anesthesia protocols. valsartan (DIOVAN) 320 mg tablet Take  by mouth two (2) times a day. ACETAMINOPHEN/DIPHENHYDRAMINE (TYLENOL PM PO) Take  by mouth. Omega-3 Fatty Acids (FISH OIL) 300 mg cap Take  by mouth. ascorbic acid, vitamin C, (VITAMIN C) 1,000 mg tablet Take  by mouth daily. insulin regular (NOVOLIN R, HUMULIN R) 100 unit/mL injection by SubCUTAneous route. Regular Insulin 8 units before breakfast and 8 units before lunch and 10 units at bedtime       !! - Potential duplicate medications found. Please discuss with provider. STOP taking these medications       losartan (COZAAR) 50 mg tablet Comments:   Reason for Stopping:                 Time spent in patient discharge planning and coordination 35 minutes.     Signed:  Placido Kong MD

## 2020-05-12 NOTE — PROGRESS NOTES
Care Management Interventions  PCP Verified by CM: Yes(Dr. Earl Faust)  Mode of Transport at Discharge: BLS  Transition of Care Consult (CM Consult): Discharge Planning  Discharge Durable Medical Equipment: Yes(Rollator)  Physical Therapy Consult: Yes  Occupational Therapy Consult: Yes  Speech Therapy Consult: No  Current Support Network: Own Home, Other(Patients daughter lives with her)  Confirm Follow Up Transport: Family  The Plan for Transition of Care is Related to the Following Treatment Goals : comfort care  The Patient and/or Patient Representative was Provided with a Choice of Provider and Agrees with the Discharge Plan?: Yes  Name of the Patient Representative Who was Provided with a Choice of Provider and Agrees with the Discharge Plan: Patient Dave Bagley of Choice List was Provided with Basic Dialogue that Supports the Patient's Individualized Plan of Care/Goals, Treatment Preferences and Shares the Quality Data Associated with the Providers?: Yes  [place] Resource Information Provided?: No  Discharge Location  Discharge Placement: Home with hospice    Patient to discharge home with [facility] today. [facility] family request at [phone]. RN notified of transport time. DNR has been signed and confirmed via telephone by son Edenilson Massey [facility]) verified by two RN's. All milestones for discharge have been met. CM remains available should any needs arise.

## 2020-05-13 ENCOUNTER — PATIENT OUTREACH (OUTPATIENT)
Dept: CASE MANAGEMENT | Age: 80
End: 2020-05-13

## 2020-05-13 LAB — EMERGENT DISEASE PANEL, EDPR: NOT DETECTED

## 2020-05-13 NOTE — PROGRESS NOTES
No transition of care outreach indicated due to patient discharge to [address] [place] [address]. All needs met by facility.

## 2020-05-14 LAB
BACTERIA SPEC CULT: NORMAL
BACTERIA SPEC CULT: NORMAL
SERVICE CMNT-IMP: NORMAL
SERVICE CMNT-IMP: NORMAL